# Patient Record
Sex: MALE | Race: WHITE | Employment: UNEMPLOYED | URBAN - METROPOLITAN AREA
[De-identification: names, ages, dates, MRNs, and addresses within clinical notes are randomized per-mention and may not be internally consistent; named-entity substitution may affect disease eponyms.]

---

## 2023-09-25 LAB
ALANINE AMINOTRANSFERASE (SGPT) (U/L) IN SER/PLAS: 21 U/L (ref 10–52)
ALBUMIN (G/DL) IN SER/PLAS: 4.1 G/DL (ref 3.4–5)
ALKALINE PHOSPHATASE (U/L) IN SER/PLAS: 49 U/L (ref 33–120)
ANION GAP IN SER/PLAS: 10 MMOL/L (ref 10–20)
ASPARTATE AMINOTRANSFERASE (SGOT) (U/L) IN SER/PLAS: 18 U/L (ref 9–39)
BILIRUBIN TOTAL (MG/DL) IN SER/PLAS: 0.6 MG/DL (ref 0–1.2)
CALCIUM (MG/DL) IN SER/PLAS: 9.4 MG/DL (ref 8.6–10.3)
CARBON DIOXIDE, TOTAL (MMOL/L) IN SER/PLAS: 30 MMOL/L (ref 21–32)
CHLORIDE (MMOL/L) IN SER/PLAS: 104 MMOL/L (ref 98–107)
CHOLESTEROL (MG/DL) IN SER/PLAS: 193 MG/DL (ref 0–199)
CHOLESTEROL IN HDL (MG/DL) IN SER/PLAS: 50.4 MG/DL
CHOLESTEROL/HDL RATIO: 3.8
CREATININE (MG/DL) IN SER/PLAS: 0.94 MG/DL (ref 0.5–1.3)
GFR MALE: >90 ML/MIN/1.73M2
GLUCOSE (MG/DL) IN SER/PLAS: 103 MG/DL (ref 74–99)
LDL: 117 MG/DL (ref 0–99)
POTASSIUM (MMOL/L) IN SER/PLAS: 4.5 MMOL/L (ref 3.5–5.3)
PROTEIN TOTAL: 7.2 G/DL (ref 6.4–8.2)
SODIUM (MMOL/L) IN SER/PLAS: 139 MMOL/L (ref 136–145)
TRIGLYCERIDE (MG/DL) IN SER/PLAS: 127 MG/DL (ref 0–149)
UREA NITROGEN (MG/DL) IN SER/PLAS: 10 MG/DL (ref 6–23)
VLDL: 25 MG/DL (ref 0–40)

## 2023-09-26 LAB
ESTIMATED AVERAGE GLUCOSE FOR HBA1C: 163 MG/DL
HEMOGLOBIN A1C/HEMOGLOBIN TOTAL IN BLOOD: 7.3 %

## 2023-11-29 ENCOUNTER — TELEPHONE (OUTPATIENT)
Dept: PRIMARY CARE | Facility: CLINIC | Age: 48
End: 2023-11-29
Payer: MEDICAID

## 2023-12-07 ENCOUNTER — OFFICE VISIT (OUTPATIENT)
Dept: PRIMARY CARE | Facility: CLINIC | Age: 48
End: 2023-12-07
Payer: MEDICAID

## 2023-12-07 VITALS
WEIGHT: 226.8 LBS | BODY MASS INDEX: 32.47 KG/M2 | DIASTOLIC BLOOD PRESSURE: 82 MMHG | HEIGHT: 70 IN | OXYGEN SATURATION: 96 % | SYSTOLIC BLOOD PRESSURE: 126 MMHG | TEMPERATURE: 97.8 F | HEART RATE: 76 BPM

## 2023-12-07 DIAGNOSIS — G89.29 CHRONIC NECK PAIN: ICD-10-CM

## 2023-12-07 DIAGNOSIS — M54.2 CHRONIC NECK PAIN: ICD-10-CM

## 2023-12-07 DIAGNOSIS — K04.7 TOOTH ABSCESS: Primary | ICD-10-CM

## 2023-12-07 PROCEDURE — 99214 OFFICE O/P EST MOD 30 MIN: CPT | Performed by: FAMILY MEDICINE

## 2023-12-07 PROCEDURE — 1036F TOBACCO NON-USER: CPT | Performed by: FAMILY MEDICINE

## 2023-12-07 RX ORDER — ATORVASTATIN CALCIUM 20 MG/1
20 TABLET, FILM COATED ORAL DAILY
COMMUNITY
End: 2024-03-26 | Stop reason: SDUPTHER

## 2023-12-07 RX ORDER — SITAGLIPTIN 50 MG/1
50 TABLET, FILM COATED ORAL DAILY
COMMUNITY
Start: 2023-09-13 | End: 2024-03-26 | Stop reason: SDUPTHER

## 2023-12-07 RX ORDER — TIZANIDINE 4 MG/1
4 TABLET ORAL EVERY 8 HOURS PRN
Qty: 60 TABLET | Refills: 2 | Status: SHIPPED | OUTPATIENT
Start: 2023-12-07 | End: 2024-03-26 | Stop reason: SDUPTHER

## 2023-12-07 RX ORDER — IBUPROFEN 800 MG/1
800 TABLET ORAL EVERY 8 HOURS PRN
Qty: 90 TABLET | Refills: 2 | Status: SHIPPED | OUTPATIENT
Start: 2023-12-07 | End: 2024-03-06

## 2023-12-07 RX ORDER — TIZANIDINE 4 MG/1
4 TABLET ORAL EVERY 8 HOURS PRN
COMMUNITY
Start: 2023-11-29 | End: 2023-12-07 | Stop reason: SDUPTHER

## 2023-12-07 RX ORDER — AMOXICILLIN AND CLAVULANATE POTASSIUM 875; 125 MG/1; MG/1
875 TABLET, FILM COATED ORAL 2 TIMES DAILY
Qty: 14 TABLET | Refills: 0 | Status: SHIPPED | OUTPATIENT
Start: 2023-12-07 | End: 2023-12-14

## 2023-12-07 RX ORDER — IBUPROFEN 800 MG/1
800 TABLET ORAL EVERY 8 HOURS PRN
COMMUNITY
End: 2023-12-07 | Stop reason: SDUPTHER

## 2023-12-07 NOTE — PROGRESS NOTES
"Subjective   Patient ID: Aneesh Obrien is a 48 y.o. male who presents for Back Pain.    HPI C/o pain start in the middle of his back radiating up into his neck into his arms with numbness.tingling sensations. Patient had Cervical and Shoulder X-ray in 2021. Takes Motrin and Tizanidine PRN. Patient would like to discuss on getting MRI set up.     Also c/o possible tooth infection - right side of mouth     Review of Systems   All other systems reviewed and are negative.      Objective   /82   Pulse 76   Temp 36.6 °C (97.8 °F)   Ht 1.778 m (5' 10\")   Wt 103 kg (226 lb 12.8 oz)   SpO2 96%   BMI 32.54 kg/m²     Physical Exam  Constitutional:       Appearance: Normal appearance.   Eyes:      Conjunctiva/sclera: Conjunctivae normal.   Cardiovascular:      Rate and Rhythm: Normal rate and regular rhythm.   Pulmonary:      Effort: Pulmonary effort is normal.      Breath sounds: Normal breath sounds.   Abdominal:      Palpations: Abdomen is soft.   Musculoskeletal:         General: Normal range of motion.   Skin:     General: Skin is warm and dry.   Neurological:      Mental Status: He is alert.   Psychiatric:         Mood and Affect: Mood normal.       Assessment/Plan Chronic medications refilled per protocol. Antibiotic Rx. Recommended he schedule with dentist ASAP.         "

## 2024-01-04 ENCOUNTER — LAB (OUTPATIENT)
Dept: LAB | Facility: LAB | Age: 49
End: 2024-01-04
Payer: MEDICAID

## 2024-01-04 DIAGNOSIS — E11.9 TYPE 2 DIABETES MELLITUS WITHOUT COMPLICATION, WITHOUT LONG-TERM CURRENT USE OF INSULIN (MULTI): ICD-10-CM

## 2024-01-04 DIAGNOSIS — E78.5 HYPERLIPIDEMIA, UNSPECIFIED HYPERLIPIDEMIA TYPE: ICD-10-CM

## 2024-01-04 LAB
ALBUMIN SERPL BCP-MCNC: 4.1 G/DL (ref 3.4–5)
ALP SERPL-CCNC: 46 U/L (ref 33–120)
ALT SERPL W P-5'-P-CCNC: 23 U/L (ref 10–52)
ANION GAP SERPL CALC-SCNC: 12 MMOL/L (ref 10–20)
AST SERPL W P-5'-P-CCNC: 18 U/L (ref 9–39)
BILIRUB SERPL-MCNC: 0.5 MG/DL (ref 0–1.2)
BUN SERPL-MCNC: 11 MG/DL (ref 6–23)
CALCIUM SERPL-MCNC: 9.6 MG/DL (ref 8.6–10.3)
CHLORIDE SERPL-SCNC: 106 MMOL/L (ref 98–107)
CHOLEST SERPL-MCNC: 147 MG/DL (ref 0–199)
CHOLESTEROL/HDL RATIO: 3.7
CO2 SERPL-SCNC: 26 MMOL/L (ref 21–32)
CREAT SERPL-MCNC: 0.8 MG/DL (ref 0.5–1.3)
GFR SERPL CREATININE-BSD FRML MDRD: >90 ML/MIN/1.73M*2
GLUCOSE SERPL-MCNC: 106 MG/DL (ref 74–99)
HDLC SERPL-MCNC: 39.4 MG/DL
LDLC SERPL CALC-MCNC: 87 MG/DL
NON HDL CHOLESTEROL: 108 MG/DL (ref 0–149)
POTASSIUM SERPL-SCNC: 4.8 MMOL/L (ref 3.5–5.3)
PROT SERPL-MCNC: 7 G/DL (ref 6.4–8.2)
SODIUM SERPL-SCNC: 139 MMOL/L (ref 136–145)
TRIGL SERPL-MCNC: 103 MG/DL (ref 0–149)
VLDL: 21 MG/DL (ref 0–40)

## 2024-01-04 PROCEDURE — 36415 COLL VENOUS BLD VENIPUNCTURE: CPT

## 2024-01-04 PROCEDURE — 83036 HEMOGLOBIN GLYCOSYLATED A1C: CPT

## 2024-01-04 PROCEDURE — 80053 COMPREHEN METABOLIC PANEL: CPT

## 2024-01-04 PROCEDURE — 80061 LIPID PANEL: CPT

## 2024-01-05 LAB
EST. AVERAGE GLUCOSE BLD GHB EST-MCNC: 160 MG/DL
HBA1C MFR BLD: 7.2 %

## 2024-01-11 DIAGNOSIS — G89.29 CHRONIC NECK PAIN: ICD-10-CM

## 2024-01-11 DIAGNOSIS — M54.2 CHRONIC NECK PAIN: ICD-10-CM

## 2024-02-08 RX ORDER — TIZANIDINE HYDROCHLORIDE 4 MG/1
4 CAPSULE, GELATIN COATED ORAL EVERY 8 HOURS PRN
Qty: 60 CAPSULE | Refills: 2 | OUTPATIENT
Start: 2024-02-08 | End: 2024-05-08

## 2024-03-26 ENCOUNTER — OFFICE VISIT (OUTPATIENT)
Dept: PRIMARY CARE | Facility: CLINIC | Age: 49
End: 2024-03-26
Payer: MEDICAID

## 2024-03-26 ENCOUNTER — LAB (OUTPATIENT)
Dept: LAB | Facility: LAB | Age: 49
End: 2024-03-26
Payer: MEDICAID

## 2024-03-26 VITALS
DIASTOLIC BLOOD PRESSURE: 80 MMHG | OXYGEN SATURATION: 97 % | HEART RATE: 93 BPM | WEIGHT: 216 LBS | TEMPERATURE: 97.7 F | SYSTOLIC BLOOD PRESSURE: 134 MMHG | BODY MASS INDEX: 30.92 KG/M2 | HEIGHT: 70 IN

## 2024-03-26 DIAGNOSIS — E78.5 HYPERLIPIDEMIA, UNSPECIFIED HYPERLIPIDEMIA TYPE: ICD-10-CM

## 2024-03-26 DIAGNOSIS — G89.29 CHRONIC NECK PAIN: ICD-10-CM

## 2024-03-26 DIAGNOSIS — Z11.4 ENCOUNTER FOR SCREENING FOR HIV: ICD-10-CM

## 2024-03-26 DIAGNOSIS — M50.30 DEGENERATION OF INTERVERTEBRAL DISC OF CERVICAL REGION: ICD-10-CM

## 2024-03-26 DIAGNOSIS — R09.81 NASAL CONGESTION: ICD-10-CM

## 2024-03-26 DIAGNOSIS — M54.2 CHRONIC NECK PAIN: ICD-10-CM

## 2024-03-26 DIAGNOSIS — E11.9 TYPE 2 DIABETES MELLITUS WITHOUT COMPLICATION, WITHOUT LONG-TERM CURRENT USE OF INSULIN (MULTI): ICD-10-CM

## 2024-03-26 DIAGNOSIS — M54.12 ACUTE CERVICAL RADICULOPATHY: ICD-10-CM

## 2024-03-26 DIAGNOSIS — M48.02 SPINAL STENOSIS, CERVICAL REGION: ICD-10-CM

## 2024-03-26 DIAGNOSIS — K29.00 ACUTE GASTRITIS WITHOUT HEMORRHAGE, UNSPECIFIED GASTRITIS TYPE: ICD-10-CM

## 2024-03-26 DIAGNOSIS — M50.20 HERNIATED NUCLEUS PULPOSUS, CERVICAL: ICD-10-CM

## 2024-03-26 DIAGNOSIS — L60.8 DISCOLORATION OF NAIL: Primary | ICD-10-CM

## 2024-03-26 DIAGNOSIS — H61.23 BILATERAL IMPACTED CERUMEN: ICD-10-CM

## 2024-03-26 LAB
ALBUMIN SERPL BCP-MCNC: 4.5 G/DL (ref 3.4–5)
ALP SERPL-CCNC: 57 U/L (ref 33–120)
ALT SERPL W P-5'-P-CCNC: 26 U/L (ref 10–52)
ANION GAP SERPL CALC-SCNC: 13 MMOL/L (ref 10–20)
AST SERPL W P-5'-P-CCNC: 18 U/L (ref 9–39)
BILIRUB SERPL-MCNC: 0.5 MG/DL (ref 0–1.2)
BUN SERPL-MCNC: 14 MG/DL (ref 6–23)
CALCIUM SERPL-MCNC: 9.9 MG/DL (ref 8.6–10.3)
CHLORIDE SERPL-SCNC: 104 MMOL/L (ref 98–107)
CHOLEST SERPL-MCNC: 246 MG/DL (ref 0–199)
CHOLESTEROL/HDL RATIO: 4.3
CO2 SERPL-SCNC: 29 MMOL/L (ref 21–32)
CREAT SERPL-MCNC: 0.9 MG/DL (ref 0.5–1.3)
EGFRCR SERPLBLD CKD-EPI 2021: >90 ML/MIN/1.73M*2
GLUCOSE SERPL-MCNC: 99 MG/DL (ref 74–99)
HDLC SERPL-MCNC: 56.9 MG/DL
LDLC SERPL CALC-MCNC: 150 MG/DL
NON HDL CHOLESTEROL: 189 MG/DL (ref 0–149)
POTASSIUM SERPL-SCNC: 4.7 MMOL/L (ref 3.5–5.3)
PROT SERPL-MCNC: 7.8 G/DL (ref 6.4–8.2)
SODIUM SERPL-SCNC: 141 MMOL/L (ref 136–145)
TRIGL SERPL-MCNC: 196 MG/DL (ref 0–149)
VLDL: 39 MG/DL (ref 0–40)

## 2024-03-26 PROCEDURE — 36415 COLL VENOUS BLD VENIPUNCTURE: CPT

## 2024-03-26 PROCEDURE — 80053 COMPREHEN METABOLIC PANEL: CPT

## 2024-03-26 PROCEDURE — 80061 LIPID PANEL: CPT

## 2024-03-26 PROCEDURE — 3075F SYST BP GE 130 - 139MM HG: CPT | Performed by: FAMILY MEDICINE

## 2024-03-26 PROCEDURE — 99417 PROLNG OP E/M EACH 15 MIN: CPT | Performed by: FAMILY MEDICINE

## 2024-03-26 PROCEDURE — 87389 HIV-1 AG W/HIV-1&-2 AB AG IA: CPT

## 2024-03-26 PROCEDURE — 3051F HG A1C>EQUAL 7.0%<8.0%: CPT | Performed by: FAMILY MEDICINE

## 2024-03-26 PROCEDURE — 3079F DIAST BP 80-89 MM HG: CPT | Performed by: FAMILY MEDICINE

## 2024-03-26 PROCEDURE — 99215 OFFICE O/P EST HI 40 MIN: CPT | Performed by: FAMILY MEDICINE

## 2024-03-26 PROCEDURE — 3050F LDL-C >= 130 MG/DL: CPT | Performed by: FAMILY MEDICINE

## 2024-03-26 RX ORDER — ATORVASTATIN CALCIUM 20 MG/1
20 TABLET, FILM COATED ORAL DAILY
Qty: 90 TABLET | Refills: 0 | Status: SHIPPED | OUTPATIENT
Start: 2024-03-26 | End: 2024-04-01 | Stop reason: SDUPTHER

## 2024-03-26 RX ORDER — IPRATROPIUM BROMIDE 42 UG/1
2 SPRAY, METERED NASAL 4 TIMES DAILY PRN
Qty: 15 ML | Refills: 12 | Status: SHIPPED | OUTPATIENT
Start: 2024-03-26 | End: 2024-04-25

## 2024-03-26 RX ORDER — OMEPRAZOLE 40 MG/1
40 CAPSULE, DELAYED RELEASE ORAL
Qty: 30 CAPSULE | Refills: 0 | Status: SHIPPED | OUTPATIENT
Start: 2024-03-26 | End: 2024-04-25

## 2024-03-26 RX ORDER — TIZANIDINE 4 MG/1
4 TABLET ORAL EVERY 8 HOURS PRN
Qty: 60 TABLET | Refills: 2 | Status: SHIPPED | OUTPATIENT
Start: 2024-03-26 | End: 2024-06-24

## 2024-03-26 RX ORDER — SITAGLIPTIN 50 MG/1
50 TABLET, FILM COATED ORAL DAILY
Qty: 90 TABLET | Refills: 0 | Status: SHIPPED | OUTPATIENT
Start: 2024-03-26 | End: 2024-06-24

## 2024-03-26 RX ORDER — IBUPROFEN 800 MG/1
TABLET ORAL
COMMUNITY
Start: 2024-03-17 | End: 2024-05-31 | Stop reason: ALTCHOICE

## 2024-03-26 NOTE — PROGRESS NOTES
"Subjective   Patient ID: Aneesh Obrien is a 49 y.o. male who presents for Nail Problem.    HPI     States he left for [place] end of this past January for work, recently came home and noticed toenail discoloration on both of his big toe nails. Denies pain to area. No thickening or yellowing. Discolorations is more dark.     Pt is here for refills of medications to treat the following medical conditions. Unless otherwise stated, these conditions are well-controlled, pt is taking medications as Rx, and there are no reported side effects to medications or other treatment: DM2 - Last HGBA1C done on 1/4/2024 with result of 7.2. Hyperlipidemia - Patient ran out of Lipitor two days ago. Is fasting for labs. Also is requesting HIV testing to be ordered. Denies exposure, is just wanting screening.    Pt previously saw shoulder and neck specialist (Dr. Doe and Dr. Song, respectively) and he was told he had shoulder issues, but that his neck pain was more pressing. He was Dx with acute cervical radiculopathy, degeneration of intervertebral disc of cervical region, herniated nucleus pulposus, and spinal stenosis of cervical region. Dr. Song believes that there is a root lesion in his neck, and ordered MRI. PT was needed to complete the MRI, but he did not go through with this. Also discussed decompression and fusion with surgery, which pt is still interested in. Is requesting for us to order PT referral and possibly MRI prior to being referred back to Dr. Song.     Pt also c/o nasal congestion. Has been using Afrin very often for weeks without relief.    Also c/o that he cannot hear out of his left ear. \"Feels plugged.\" Would like this assessed also.     Pt also c/o epigastric pain. Drinks coffee daily and uses [id number] mg of IBU daily for his neck and shoulder pain. Feels \"off.\"     Review of Systems   All other systems reviewed and are negative.      Objective   /80   Pulse 93   Temp 36.5 °C (97.7 °F)   Ht " "1.778 m (5' 10\")   Wt 98 kg (216 lb)   SpO2 97%   BMI 30.99 kg/m²     Physical Exam  Vitals reviewed.   Constitutional:       General: He is awake.      Appearance: Normal appearance. He is well-developed.   HENT:      Head: Normocephalic and atraumatic.      Right Ear: There is impacted cerumen.      Left Ear: There is impacted cerumen.      Nose: Congestion and rhinorrhea present. Rhinorrhea is clear.   Cardiovascular:      Rate and Rhythm: Normal rate.   Pulmonary:      Effort: Pulmonary effort is normal.   Musculoskeletal:      Cervical back: Full passive range of motion without pain.      Right lower leg: No edema.      Left lower leg: No edema.   Feet:      Comments: Discoloration that is blue/black/purple in color on bilateral great toenails. Very mild thickening of the great toes and second toes of each foot.   Skin:     General: Skin is warm and dry.      Findings: No lesion or rash.   Neurological:      General: No focal deficit present.      Mental Status: He is alert and oriented to person, place, and time.      Cranial Nerves: No facial asymmetry.      Motor: Motor function is intact.      Gait: Gait is intact.   Psychiatric:         Attention and Perception: Attention normal.         Mood and Affect: Mood and affect normal.         Assessment/Plan   Diagnoses and all orders for this visit:  Discoloration of nail  -     Referral to Podiatry; Future  Type 2 diabetes mellitus without complication, without long-term current use of insulin (CMS/[facility])  -     Januvia 50 mg tablet; Take 1 tablet (50 mg) by mouth once daily.  Hyperlipidemia, unspecified hyperlipidemia type  -     atorvastatin (Lipitor) 20 mg tablet; Take 1 tablet (20 mg) by mouth once daily.  -     Comprehensive metabolic panel; Future  -     Lipid panel; Future  Chronic neck pain  -     tiZANidine (Zanaflex) 4 mg tablet; Take 1 tablet (4 mg) by mouth every 8 hours if needed for muscle spasms.  Encounter for screening for HIV  -     HIV 1/2 " Antigen/Antibody Screen with Reflex to Confirmation; Future  Nasal congestion  -     ipratropium (Atrovent) 42 mcg (0.06 %) nasal spray; Administer 2 sprays into each nostril 4 times a day as needed for rhinitis.  Acute gastritis without hemorrhage, unspecified gastritis type  -     omeprazole (PriLOSEC) 40 mg DR capsule; Take 1 capsule (40 mg) by mouth once daily in the morning. Take before meals. Do not crush or chew.  Bilateral impacted cerumen  -     carbamide peroxide (Debrox) 6.5 % otic solution; Administer 5 drops into each ear 2 times a day for 5 days.    - Chronic medications refilled as above - is a little over 1 week early for A1c. He will get the rest of his labs today except for the A1c and we will get the A1c at his 1 week follow up appt  - Atrovent nasal spray for congestion. Discussed that Afrin can cause rebound congestion. Should discontinue this immediately.  - Podiatry referral for toenail discoloration - does not necessarily seem consistent with fungal disease, but no direct trauma to explain subungual hematoma. Also is not going down.  - PT for neck and shoulder pain with numbness/tingling/paresthesias   - Trial of omeprazole for epigastric pain - likely d/t gastritis. Will bring pt back in 1 week to see how this is working and also to discuss his epigastric pain further - at this time, pt had multiple complaints and in the interest of time would prefer to discuss at separate appt.   - Debrox Rx - will plan to do lavage at 1 week follow up. Should use this first to soften wax.

## 2024-03-26 NOTE — PATIENT INSTRUCTIONS
Atrovent given for nasal spray for congestion  Podiatry referral entered in chart for nail discoloration  PT referral for chronic neck/shoulder pain in order to get MRI covered  Trial of omeprazole for stomach pain - will discuss further at visit next week. Suspect gastritis from ibuprofen use. Only use ibuprofen when needed, and can use Tylenol in between if additional pain relielf is needed. Avoid spicy/acidic foods and caffeine.   Debrox given to use in the ears - use this prior to appt next week so we can clean out your ears

## 2024-03-27 LAB — HIV 1+2 AB+HIV1 P24 AG SERPL QL IA: NONREACTIVE

## 2024-04-01 RX ORDER — ATORVASTATIN CALCIUM 40 MG/1
40 TABLET, FILM COATED ORAL DAILY
Qty: 90 TABLET | Refills: 0 | Status: SHIPPED | OUTPATIENT
Start: 2024-04-01 | End: 2024-06-30

## 2024-04-02 ENCOUNTER — OFFICE VISIT (OUTPATIENT)
Dept: PRIMARY CARE | Facility: CLINIC | Age: 49
End: 2024-04-02
Payer: MEDICAID

## 2024-04-02 VITALS
SYSTOLIC BLOOD PRESSURE: 120 MMHG | DIASTOLIC BLOOD PRESSURE: 70 MMHG | BODY MASS INDEX: 31.78 KG/M2 | WEIGHT: 222 LBS | OXYGEN SATURATION: 98 % | TEMPERATURE: 97.9 F | HEIGHT: 70 IN | HEART RATE: 67 BPM

## 2024-04-02 DIAGNOSIS — M19.90 ARTHRITIS: ICD-10-CM

## 2024-04-02 DIAGNOSIS — T48.5X5A RHINITIS MEDICAMENTOSA: ICD-10-CM

## 2024-04-02 DIAGNOSIS — R10.13 EPIGASTRIC PAIN: ICD-10-CM

## 2024-04-02 DIAGNOSIS — J31.0 RHINITIS MEDICAMENTOSA: ICD-10-CM

## 2024-04-02 DIAGNOSIS — J01.80 ACUTE NON-RECURRENT SINUSITIS OF OTHER SINUS: Primary | ICD-10-CM

## 2024-04-02 DIAGNOSIS — E11.9 TYPE 2 DIABETES MELLITUS WITHOUT COMPLICATION, WITHOUT LONG-TERM CURRENT USE OF INSULIN (MULTI): ICD-10-CM

## 2024-04-02 LAB — POC HEMOGLOBIN A1C: 6.4 % (ref 4.2–6.5)

## 2024-04-02 PROCEDURE — 3074F SYST BP LT 130 MM HG: CPT | Performed by: FAMILY MEDICINE

## 2024-04-02 PROCEDURE — 3051F HG A1C>EQUAL 7.0%<8.0%: CPT | Performed by: FAMILY MEDICINE

## 2024-04-02 PROCEDURE — 3078F DIAST BP <80 MM HG: CPT | Performed by: FAMILY MEDICINE

## 2024-04-02 PROCEDURE — 83036 HEMOGLOBIN GLYCOSYLATED A1C: CPT | Performed by: FAMILY MEDICINE

## 2024-04-02 PROCEDURE — 3050F LDL-C >= 130 MG/DL: CPT | Performed by: FAMILY MEDICINE

## 2024-04-02 PROCEDURE — 99215 OFFICE O/P EST HI 40 MIN: CPT | Performed by: FAMILY MEDICINE

## 2024-04-02 RX ORDER — MELOXICAM 15 MG/1
15 TABLET ORAL DAILY
Qty: 90 TABLET | Refills: 0 | Status: SHIPPED | OUTPATIENT
Start: 2024-04-02 | End: 2024-07-01

## 2024-04-02 RX ORDER — DOXYCYCLINE 100 MG/1
100 CAPSULE ORAL 2 TIMES DAILY
Qty: 14 CAPSULE | Refills: 0 | Status: SHIPPED | OUTPATIENT
Start: 2024-04-02 | End: 2024-04-09

## 2024-04-02 NOTE — PROGRESS NOTES
"Subjective   Patient ID: Aneesh Obrien is a 49 y.o. male who presents for Follow-up.    HPI   Epigastric Pain - Is taking Omeprazole as Rx and isn't sure if medication has helped with his symptoms. No blood in stool, no emsis. He has increased use of Ibuprofen for arthritic pain.    Nasal congestion - Patient continues with congestion. Also has headaches, ear ache. Symptoms have been going on for two months. Tried Atrovent nasal spray with no improvement. States he is using Afrin nasal spray 2-3 times throughout the day over the past two months with temporary relief in symptoms. Also has used debrox for his ears.     DM2 - Patient received medication refill at his last office visit but wasn't due for blood work. IO HGBA1C ran at today's office visit.     Review of Systems   All other systems reviewed and are negative.      Objective   /70   Pulse 67   Temp 36.6 °C (97.9 °F)   Ht 1.778 m (5' 10\")   Wt 101 kg (222 lb)   SpO2 98%   BMI 31.85 kg/m²     Physical Exam  Constitutional:       Appearance: Normal appearance.   Eyes:      Conjunctiva/sclera: Conjunctivae normal.   Cardiovascular:      Rate and Rhythm: Normal rate and regular rhythm.   Pulmonary:      Effort: Pulmonary effort is normal.      Breath sounds: Normal breath sounds.   Abdominal:      Palpations: Abdomen is soft.   Musculoskeletal:         General: Normal range of motion.   Skin:     General: Skin is warm and dry.   Neurological:      Mental Status: He is alert.   Psychiatric:         Mood and Affect: Mood normal.         Assessment/Plan   Diagnoses and all orders for this visit:  Acute non-recurrent sinusitis of other sinus  -     doxycycline (Vibramycin) 100 mg capsule; Take 1 capsule (100 mg) by mouth 2 times a day for 7 days. Take with at least 8 ounces (large glass) of water, do not lie down for 30 minutes after  - Stop using Afrin nasal spray and continue with Atrovent as Rx  - RTC if Sx not markedly improved after following " directions over the next 2-3 weeks -- will consider CT scan at that time if needed  Type 2 diabetes mellitus without complication, without long-term current use of insulin (CMS/[facility])  -     POCT glycosylated hemoglobin (Hb A1C) manually resulted = 6.4  - Continue with present medications and will see back inn 3 months for recheck and refills  Arthritis  -     meloxicam (Mobic) 15 mg tablet; Take 1 tablet (15 mg) by mouth once daily. STOP Ibuprofen.  Rhinitis medicamentosa        - As discussed above  Epigastric pain        - Will consider additional work up with any progression of pain or Sx as noted above. He will continue to take PPI and will stop NSAID.

## 2024-04-04 ENCOUNTER — OFFICE VISIT (OUTPATIENT)
Dept: PODIATRY | Facility: CLINIC | Age: 49
End: 2024-04-04
Payer: MEDICAID

## 2024-04-04 DIAGNOSIS — L60.8 DISCOLORATION OF NAIL: ICD-10-CM

## 2024-04-04 DIAGNOSIS — E11.65 POORLY CONTROLLED DIABETES MELLITUS (MULTI): ICD-10-CM

## 2024-04-04 DIAGNOSIS — S90.119A: Primary | ICD-10-CM

## 2024-04-04 DIAGNOSIS — S90.112A: ICD-10-CM

## 2024-04-04 PROCEDURE — 1036F TOBACCO NON-USER: CPT | Performed by: PODIATRIST

## 2024-04-04 PROCEDURE — 3051F HG A1C>EQUAL 7.0%<8.0%: CPT | Performed by: PODIATRIST

## 2024-04-04 PROCEDURE — 99202 OFFICE O/P NEW SF 15 MIN: CPT | Performed by: PODIATRIST

## 2024-04-04 PROCEDURE — 3050F LDL-C >= 130 MG/DL: CPT | Performed by: PODIATRIST

## 2024-04-04 NOTE — PROGRESS NOTES
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  PCP: Dr. Montejo  Last visit: 4-2-24     PMH  Past Medical History:   Diagnosis Date    Personal history of other endocrine, nutritional and metabolic disease     History of diabetes mellitus     MEDS    Current Outpatient Medications:     atorvastatin (Lipitor) 40 mg tablet, Take 1 tablet (40 mg) by mouth once daily., Disp: 90 tablet, Rfl: 0    doxycycline (Vibramycin) 100 mg capsule, Take 1 capsule (100 mg) by mouth 2 times a day for 7 days. Take with at least 8 ounces (large glass) of water, do not lie down for 30 minutes after, Disp: 14 capsule, Rfl: 0    ibuprofen 800 mg tablet, TAKE 1 TABLET BY MOUTH EVERY 8 HOURS IF NEEDED FOR MILD PAIN, Disp: , Rfl:     ipratropium (Atrovent) 42 mcg (0.06 %) nasal spray, Administer 2 sprays into each nostril 4 times a day as needed for rhinitis., Disp: 15 mL, Rfl: 12    Januvia 50 mg tablet, Take 1 tablet (50 mg) by mouth once daily., Disp: 90 tablet, Rfl: 0    meloxicam (Mobic) 15 mg tablet, Take 1 tablet (15 mg) by mouth once daily., Disp: 90 tablet, Rfl: 0    omeprazole (PriLOSEC) 40 mg DR capsule, Take 1 capsule (40 mg) by mouth once daily in the morning. Take before meals. Do not crush or chew., Disp: 30 capsule, Rfl: 0    tiZANidine (Zanaflex) 4 mg tablet, Take 1 tablet (4 mg) by mouth every 8 hours if needed for muscle spasms., Disp: 60 tablet, Rfl: 2  Allergies  No Known Allergies  Social History     Socioeconomic History    Marital status: Single     Spouse name: None    Number of children: None    Years of education: None    Highest education level: None   Occupational History    None   Tobacco Use    Smoking status: Never    Smokeless tobacco: Never   Substance and Sexual Activity    Alcohol use: Not Currently    Drug use: Yes     Types: Marijuana    Sexual activity: None   Other Topics Concern     None   Social History Narrative    None     Social Determinants of Health     Financial Resource Strain: Not on file   Food Insecurity: Not on file   Transportation Needs: Not on file   Physical Activity: Not on file   Stress: Not on file   Social Connections: Not on file   Intimate Partner Violence: Not on file   Housing Stability: Not on file     No family history on file.  Past Surgical History:   Procedure Laterality Date    OTHER SURGICAL HISTORY  03/05/2021    Knee surgery    OTHER SURGICAL HISTORY  03/05/2021    Lower back surgery       REVIEW OF SYSTEMS    + as noted above in HPI.       Physical examination:   On General Observation: Patient is a pleasant, cooperative, well developed 49 y.o.  adult male. The patient is alert and oriented to time, place and person. Patient has normal affect and mood.  There were no vitals taken for this visit.    Vascular:  DP and PT pulses are 2/4 b/l.  no edema noted. no varicosities b/l.  CFT  5 seconds to all digits bilateral.  Skin temperature is warm to warm from proximal to distal bilateral.      Muscular: Strength is 5/5 b/l.  Motor strength is normal and symmetric proximally, as well as distally, in all four extremities. Good mobility of all extremities.      Neuro:  Proprioception present.   Sensation to vibration is  present. Protective sensation present  at all pedal sites via [place] Randall 5.07 monofilament bilateral.  Light touch intact bilateral.     Derm: Stable dry hematoma is present hallux nails, no lysis is present, no odor or drainage.    Ortho:  Rom stable stj, aj b/l le        ASSESSMENT:    Hematoma hallux stable  E11.65     PLAN:   Consult  A comprehensive history and physical examination were preformed. The patient was educated on clinical findings, diagnosis and treatment plans. Patient understands all that has been explained and all questions were answered to apparent satisfaction.   -reviewed etiology of hematoma and options, Nail bed are dry  and stable. Allow nails to grow out  DM foot exam, slight elevated sugars, no acute issues.      Sam Knapp DPM

## 2024-04-09 ENCOUNTER — EVALUATION (OUTPATIENT)
Dept: PHYSICAL THERAPY | Facility: CLINIC | Age: 49
End: 2024-04-09
Payer: MEDICAID

## 2024-04-09 DIAGNOSIS — M54.12 ACUTE CERVICAL RADICULOPATHY: Primary | ICD-10-CM

## 2024-04-09 DIAGNOSIS — M48.02 SPINAL STENOSIS, CERVICAL REGION: ICD-10-CM

## 2024-04-09 DIAGNOSIS — M50.30 DEGENERATION OF INTERVERTEBRAL DISC OF CERVICAL REGION: ICD-10-CM

## 2024-04-09 DIAGNOSIS — M50.20 HERNIATED NUCLEUS PULPOSUS, CERVICAL: ICD-10-CM

## 2024-04-09 PROCEDURE — 97110 THERAPEUTIC EXERCISES: CPT | Mod: GP | Performed by: PHYSICAL THERAPIST

## 2024-04-09 PROCEDURE — 97161 PT EVAL LOW COMPLEX 20 MIN: CPT | Mod: GP | Performed by: PHYSICAL THERAPIST

## 2024-04-09 ASSESSMENT — PAIN DESCRIPTION - DESCRIPTORS: DESCRIPTORS: ACHING;PINS AND NEEDLES

## 2024-04-09 ASSESSMENT — ENCOUNTER SYMPTOMS
DEPRESSION: 0
LOSS OF SENSATION IN FEET: 0
OCCASIONAL FEELINGS OF UNSTEADINESS: 0

## 2024-04-09 ASSESSMENT — PATIENT HEALTH QUESTIONNAIRE - PHQ9
1. LITTLE INTEREST OR PLEASURE IN DOING THINGS: NOT AT ALL
SUM OF ALL RESPONSES TO PHQ9 QUESTIONS 1 AND 2: 0
2. FEELING DOWN, DEPRESSED OR HOPELESS: NOT AT ALL

## 2024-04-09 ASSESSMENT — PAIN SCALES - GENERAL: PAINLEVEL_OUTOF10: 5 - MODERATE PAIN

## 2024-04-09 ASSESSMENT — PAIN - FUNCTIONAL ASSESSMENT: PAIN_FUNCTIONAL_ASSESSMENT: 0-10

## 2024-04-09 NOTE — PROGRESS NOTES
Physical Therapy    Physical Therapy Cervical Spine Evaluation    Patient Name: Aneesh Obrien  MRN: [id number]  Today's Date: 4/9/2024  Time Calculation  Start Time: 0745  Stop Time: 0830  Time Calculation (min): 45 min  PT Evaluation Time Entry  PT Evaluation (Low) Time Entry: 35  PT Therapeutic Procedures Time Entry  Therapeutic Exercise Time Entry: 10  Payor: [facility] BHAKTI / Plan: [facility] PLAN / Product Type: *No Product type* /     Reason for Referral: Neck pain  General Comment: Visit 1 (auth needed after eval)    Current Problem  Problem List Items Addressed This Visit    None  Visit Diagnoses         Codes    Acute cervical radiculopathy    -  Primary M54.12    Relevant Orders    Follow Up In Physical Therapy    Degeneration of intervertebral disc of cervical region     M50.30    Relevant Orders    Follow Up In Physical Therapy    Spinal stenosis, cervical region     M48.02    Relevant Orders    Follow Up In Physical Therapy    Herniated nucleus pulposus, cervical     M50.20    Relevant Orders    Follow Up In Physical Therapy           Precautions  Precautions  Precautions Comment: none    Pain  Pain Assessment: 0-10  Pain Score: 5 - Moderate pain  Pain Location: Neck  Pain Orientation: Right, Left  Pain Radiating Towards: Hands  Pain Descriptors: Aching, Pins and needles  Pain Frequency: Intermittent    SUBJECTIVE:   Chief complaint:  Pt reports he has been having a hx of neck pain and radiation of pain into the B UE to the hands for several years but more recently has been worsening about 2 months ago. No noted injuries.. Notes pain is intermittent. Notes pain starts in neck and radiates down the hands.  Notes some numbness and tingling in the UE's as well. Notes no significant weakness. Does note some stiffness in the neck at this time.       Pain Better: RX and OTC meds,     Pain Worse: bending and reaching for a lower object.     Imaging: no recent imaging.     Prior  "level of function: previously independent with all prior activity.   Current limitations: reaching, lifting, sleep.     Home setup: reviewed an no concern     Work: Baseball officiating     Patients goal: \"I want to feel better and not hurt all the time\"    Prior tx:no current PT tx this year.     Medical hx has been reviewed with the patient.     OBJECTIVE:    Posture:     Upper extremity ROM: WNL Unless documented below:      Upper Extremity Strength:   RIGHT LEFT   Shoulder Flexion 4+ 4+   Shoulder Abduction 4+ 4+   Shoulder ER 4+ 4+   Shoulder IR 5 5   Elbow Flexion 5 5   Elbow Extension 5 5     Cervical ROM:  Date: eval ROM in Degrees    Flexion 35    Extension 25 pain into R UE     RIGHT LEFT   Side bend 15 15   Rotation 50 50     Palpation: Mild tenderness to palpation in the suboccipitals and UT, cervical paraspinals.   Special tests:  Spurling's: neg   Aylx Cervical repeated movements: Baseline pain 5/10  Rep flex 10 reps: produced pain L UE but no worse  Rep retraction 10 reps: produced pain R UE but no worse   Rep retraction with ext 10 reps: produced but no worse  2nd set rep flex seated 10 reps, lessened pain and no radiculopathy noted. Better      Other Measures  Lower Extremity Funtional Score (LEFS): 28%     TREATMENT:  Eval  2. Instruction in HEP:   Access Code: [email or web address]  URL: [email or web address]  Date: 04/09/2024  Prepared by: RICHIE Duque    Exercises  - Seated Cervical Flexion AROM  - 2 x daily - 7 x weekly - 2 sets - 10 reps  - Seated Cervical Flexion Stretch with Finger Support Behind Neck  - 2 x daily - 7 x weekly - 2 sets - 10 reps  - Seated Scapular Retraction  - 2 x daily - 7 x weekly - 2 sets - 10 reps  - Seated Upper Trapezius Stretch  - 2 x daily - 7 x weekly - 1 sets - 3 reps - 20 sec hold  - Standing Shoulder Row with Anchored Resistance  - 1 x daily - 7 x weekly - 2 sets - 10 reps  - Shoulder extension with resistance - Neutral  - 1 x daily - 7 x weekly - 2 " sets - 10 reps    ASSESSEMENT  Pt is a 49 y.o.  referred for physical therapy by Julia Fletcher PA-C  for neck pain. Pt presents with neck pain, radiation into the UE's, reduced cervical ROM, weakness proximal muscles and posture and overall reduced function. Pt's signs and symptoms were mechanically inconclusive and presents more structurally consistent with degenerative change sin the cervical spine. This patient would benefit from a therapy program to restore prior level of function, decrease pain, increase AROM, increase strength and improve posture.    Complexity: Low  Clinical presentation: Stable and/or uncomplicated characteristics  The physical therapy prognosis is good for the patient to achieve their goals.   The pt tolerated therapy treatment today well with no adverse effects.  Personal factors/Barriers to therapy/learning include:  none    PLAN  The pt will be seen 1-2 days a week for 6 weeks.      The pt has been educated about the risks and benefits of physical therapy including manual therapy treatments. Pt agrees with POC and gives consent for treatment.     The patient will benefit from physical therapy treatment to include: therapeutic exercises, therapeutic activities, neuromuscular re-education, manual therapy, modalities, and a home exercise program.     Goals:  Active       PT Problem       Reduce pain at worst to 3-4/10 with all prior activity.        Start:  04/09/24    Expected End:  04/24/24            Pt to sit with good posture approx 50-75% of the time.        Start:  04/09/24    Expected End:  04/24/24            Reduce pain at worst to 1-2/10 with all prior activity.        Start:  04/09/24    Expected End:  05/21/24            Increase cervical flexion to 60 degrees, ext to 55 degrees, rotation bilat to 75 degrees for posture, driving and self care.        Start:  04/09/24    Expected End:  05/21/24            Pt to have increased shoulder and scapular strength by 1/2 to full  MMT grade for improved ADL's, lifting and postural support.        Start:  04/09/24    Expected End:  05/21/24            Pt to have NDI score decreased by 10% to display increased overall function.        Start:  04/09/24    Expected End:  05/21/24            Pt to be independent with a HEP for self management.        Start:  04/09/24    Expected End:  05/21/24

## 2024-04-17 ENCOUNTER — TREATMENT (OUTPATIENT)
Dept: PHYSICAL THERAPY | Facility: CLINIC | Age: 49
End: 2024-04-17
Payer: MEDICAID

## 2024-04-17 DIAGNOSIS — M50.30 DEGENERATION OF INTERVERTEBRAL DISC OF CERVICAL REGION: ICD-10-CM

## 2024-04-17 DIAGNOSIS — M50.20 HERNIATED NUCLEUS PULPOSUS, CERVICAL: ICD-10-CM

## 2024-04-17 DIAGNOSIS — M54.12 ACUTE CERVICAL RADICULOPATHY: ICD-10-CM

## 2024-04-17 DIAGNOSIS — M48.02 SPINAL STENOSIS, CERVICAL REGION: ICD-10-CM

## 2024-04-17 PROCEDURE — 97140 MANUAL THERAPY 1/> REGIONS: CPT | Mod: GP,CQ

## 2024-04-17 PROCEDURE — 97110 THERAPEUTIC EXERCISES: CPT | Mod: GP,CQ

## 2024-04-17 ASSESSMENT — PAIN - FUNCTIONAL ASSESSMENT: PAIN_FUNCTIONAL_ASSESSMENT: 0-10

## 2024-04-17 ASSESSMENT — PAIN SCALES - GENERAL: PAINLEVEL_OUTOF10: 4

## 2024-04-17 ASSESSMENT — PAIN DESCRIPTION - DESCRIPTORS: DESCRIPTORS: ACHING;PINS AND NEEDLES

## 2024-04-17 NOTE — PROGRESS NOTES
Physical Therapy Treatment    Patient Name: Aneesh Obrien  MRN: [id number]  Today's Date: 4/17/2024  Time Calculation  Start Time: 0830  Stop Time: 0915  Time Calculation (min): 45 min     PT Therapeutic Procedures Time Entry  Manual Therapy Time Entry: 20  Therapeutic Exercise Time Entry: 25                 Payor: [facility] BHAKTI / Plan: [facility] PLAN / Product Type: *No Product type* /   Julia Fletcher  General Comment: Visit 2 (2 of 12)      Current Problem:  Problem List Items Addressed This Visit    None  Visit Diagnoses         Codes    Acute cervical radiculopathy     M54.12    Degeneration of intervertebral disc of cervical region     M50.30    Spinal stenosis, cervical region     M48.02    Herniated nucleus pulposus, cervical     M50.20            Subjective   General:  Pt reports neck and B UE pain /radiculopathy persists without change.   L>R UE radicular pain.  HEP is going well.   Pain:  Pain Assessment: 0-10  Pain Score: 4  Pain Location: Neck  Pain Radiating Towards: BL hands L>R  Pain Descriptors: Aching, Pins and needles  Pain Frequency: Intermittent    Precautions:  Precautions  Precautions Comment: none      Objective   No objective measures taken this visit  Palpation: Mild tenderness to palpation in the suboccipitals and UT, cervical paraspinals.     Treatment:  Therapeutic exercise  2 way posture iso 10 sec x 3 ( elbows bent, palms away from wall) palms facing wall increased symptoms so discontinued   BL shoulder ER YTB x20  BL horiz abd YTB -discontinued d/t L UE pain   BL shoulder flex to 90* 3 sec hold 2x10   BL shoulder scap to 90* 3 sec hold 2x10   T/S ext with arms below chest level with 1/2 foam 2x10   Pec stretch in door- discontinued d/t L shoulder pain     Neuromuscular Re-education       Manual   TPR to BL UT, levator, supraspinatus, pt seated in chair    Informed consent given on manual treatment. Pt given opportunity to cease treatment at any  time. Educated pt on expected soreness, possible ecchymosis. Pt voiced understanding  No adverse effects were noted post tx.      Modalities  declined    Assessment   Pt very TTP along BL C/S, scap musculature during manual tx. TPR to UT and supraspinatus reproduced symptoms into L UE. Positive response to manual tx with pt voicing reduced tension in neck and reduced intensity of tingling in Ue's.   Performed therex with emphasis on good posture and no radicular symptoms.   Discussed importance of paying attention to posture and positioning to decrease stress and tension in C/S.   Plan    Continue to progress POC as tolerated by patient to improve strength, mobility and overall function      HEP:   Access Code: [email or web address]  URL: [email or web address]  Date: 04/09/2024  Prepared by: RICHIE Duque    Exercises  - Seated Cervical Flexion AROM  - 2 x daily - 7 x weekly - 2 sets - 10 reps  - Seated Cervical Flexion Stretch with Finger Support Behind Neck  - 2 x daily - 7 x weekly - 2 sets - 10 reps  - Seated Scapular Retraction  - 2 x daily - 7 x weekly - 2 sets - 10 reps  - Seated Upper Trapezius Stretch  - 2 x daily - 7 x weekly - 1 sets - 3 reps - 20 sec hold  - Standing Shoulder Row with Anchored Resistance  - 1 x daily - 7 x weekly - 2 sets - 10 reps  - Shoulder extension with resistance - Neutral  - 1 x daily - 7 x weekly - 2 sets - 10 reps  Goals:  Active       PT Problem       Reduce pain at worst to 3-4/10 with all prior activity.        Start:  04/09/24    Expected End:  04/24/24            Pt to sit with good posture approx 50-75% of the time.        Start:  04/09/24    Expected End:  04/24/24            Reduce pain at worst to 1-2/10 with all prior activity.        Start:  04/09/24    Expected End:  05/21/24            Increase cervical flexion to 60 degrees, ext to 55 degrees, rotation bilat to 75 degrees for posture, driving and self care.        Start:  04/09/24    Expected End:  05/21/24             Pt to have increased shoulder and scapular strength by 1/2 to full MMT grade for improved ADL's, lifting and postural support.        Start:  04/09/24    Expected End:  05/21/24            Pt to have NDI score decreased by 10% to display increased overall function.        Start:  04/09/24    Expected End:  05/21/24            Pt to be independent with a HEP for self management.        Start:  04/09/24    Expected End:  05/21/24

## 2024-04-22 ENCOUNTER — TREATMENT (OUTPATIENT)
Dept: PHYSICAL THERAPY | Facility: CLINIC | Age: 49
End: 2024-04-22
Payer: MEDICAID

## 2024-04-22 DIAGNOSIS — M54.12 ACUTE CERVICAL RADICULOPATHY: Primary | ICD-10-CM

## 2024-04-22 DIAGNOSIS — M50.30 DEGENERATION OF INTERVERTEBRAL DISC OF CERVICAL REGION: ICD-10-CM

## 2024-04-22 DIAGNOSIS — M48.02 SPINAL STENOSIS, CERVICAL REGION: ICD-10-CM

## 2024-04-22 DIAGNOSIS — M50.20 HERNIATED NUCLEUS PULPOSUS, CERVICAL: ICD-10-CM

## 2024-04-22 PROCEDURE — 97110 THERAPEUTIC EXERCISES: CPT | Mod: GP | Performed by: PHYSICAL THERAPIST

## 2024-04-22 PROCEDURE — 97140 MANUAL THERAPY 1/> REGIONS: CPT | Mod: GP | Performed by: PHYSICAL THERAPIST

## 2024-04-22 ASSESSMENT — PAIN SCALES - GENERAL: PAINLEVEL_OUTOF10: 4

## 2024-04-22 ASSESSMENT — PAIN - FUNCTIONAL ASSESSMENT: PAIN_FUNCTIONAL_ASSESSMENT: 0-10

## 2024-04-22 NOTE — PROGRESS NOTES
Physical Therapy    Physical Therapy Treatment    Patient Name: Aneesh Obrien  MRN: [id number]  Today's Date: 4/22/2024  Time Calculation  Start Time: 0830  Stop Time: 0915  Time Calculation (min): 45 min  PT Therapeutic Procedures Time Entry  Manual Therapy Time Entry: 10  Therapeutic Exercise Time Entry: 35  Payor: [facility] BHAKTI / Plan: [facility] PLAN / Product Type: *No Product type* /     General Comment: Visit 3 (3 of 12)    Current Problem    Problem List Items Addressed This Visit    None  Visit Diagnoses         Codes    Acute cervical radiculopathy    -  Primary M54.12    Degeneration of intervertebral disc of cervical region     M50.30    Spinal stenosis, cervical region     M48.02    Herniated nucleus pulposus, cervical     M50.20             Subjective   Notes still pain in the cervical spine.   Compliance with HEP-partially  Precautions  Precautions  Precautions Comment: none    Pain  Pain Assessment: 0-10  Pain Score: 4  Pain Location: Neck  Pain Frequency: Intermittent      Objective   No measures today     Treatments:  Therapeutic exercise: x 35  Cervical flex with self overpressure 2 x 10  2 way posture iso 10 sec x 3 ( elbows bent, palms away from wall) palms facing wall increased symptoms so discontinued   BL shoulder ER YTB x20  BL horiz abd RTB 2 x 10   BL shoulder flex to 90* 3 sec hold 2x10 2#  BL shoulder scap to 90* 3 sec hold 2x10 2#  BL shoulder abd to 90* 3 sec hold 2 x 10 2#  T/S ext with arms below chest level with 1/2 foam 2x10   T-bar ext 5# 2 x 10   T-bar flex 5# 2 x 10  Low pec stretch at doorway 3 x 20 sec ea    Manual Tx x 10 min   Manual cervical traction intermittent pull, suboccipital release, Manual UT/Lev scap stretches    Informed consent given on manual treatment. Pt given opportunity to cease treatment at any time. Educated pt on expected soreness, possible ecchymosis. Pt voiced understanding  No adverse effects were noted post tx.   "    Assessment:  Pt overall tolerated treatment well today and is progressing with program. Notes less pain post manual treatment today and pt noted \"feeling more loose\". Recommended to be more consistent with HEP.     Plan:  Continue to progress posture and strength as tolerated.     Goals:  Active       PT Problem       Reduce pain at worst to 3-4/10 with all prior activity.        Start:  04/09/24    Expected End:  04/24/24            Pt to sit with good posture approx 50-75% of the time.        Start:  04/09/24    Expected End:  04/24/24            Reduce pain at worst to 1-2/10 with all prior activity.        Start:  04/09/24    Expected End:  05/21/24            Increase cervical flexion to 60 degrees, ext to 55 degrees, rotation bilat to 75 degrees for posture, driving and self care.        Start:  04/09/24    Expected End:  05/21/24            Pt to have increased shoulder and scapular strength by 1/2 to full MMT grade for improved ADL's, lifting and postural support.        Start:  04/09/24    Expected End:  05/21/24            Pt to have NDI score decreased by 10% to display increased overall function.        Start:  04/09/24    Expected End:  05/21/24            Pt to be independent with a HEP for self management.        Start:  04/09/24    Expected End:  05/21/24               "

## 2024-04-25 ENCOUNTER — TREATMENT (OUTPATIENT)
Dept: PHYSICAL THERAPY | Facility: CLINIC | Age: 49
End: 2024-04-25
Payer: MEDICAID

## 2024-04-25 DIAGNOSIS — M50.30 DEGENERATION OF INTERVERTEBRAL DISC OF CERVICAL REGION: ICD-10-CM

## 2024-04-25 DIAGNOSIS — M54.12 ACUTE CERVICAL RADICULOPATHY: Primary | ICD-10-CM

## 2024-04-25 DIAGNOSIS — M48.02 SPINAL STENOSIS, CERVICAL REGION: ICD-10-CM

## 2024-04-25 DIAGNOSIS — M50.20 HERNIATED NUCLEUS PULPOSUS, CERVICAL: ICD-10-CM

## 2024-04-25 PROCEDURE — 97110 THERAPEUTIC EXERCISES: CPT | Mod: GP | Performed by: PHYSICAL THERAPIST

## 2024-04-25 PROCEDURE — 97140 MANUAL THERAPY 1/> REGIONS: CPT | Mod: GP | Performed by: PHYSICAL THERAPIST

## 2024-04-25 ASSESSMENT — PAIN - FUNCTIONAL ASSESSMENT: PAIN_FUNCTIONAL_ASSESSMENT: 0-10

## 2024-04-25 ASSESSMENT — PAIN SCALES - GENERAL: PAINLEVEL_OUTOF10: 4

## 2024-04-25 NOTE — PROGRESS NOTES
Physical Therapy    Physical Therapy Treatment    Patient Name: Aneesh Obrien  MRN: [id number]  Today's Date: 4/25/2024  Time Calculation  Start Time: 0745  Stop Time: 0830  Time Calculation (min): 45 min  PT Therapeutic Procedures Time Entry  Manual Therapy Time Entry: 10  Therapeutic Exercise Time Entry: 35  Payor: [facility] BHAKTI / Plan: [facility] PLAN / Product Type: *No Product type* /     Reason for Referral: Neck pain  General Comment: Visit 4 (4 of 12)    Current Problem    Problem List Items Addressed This Visit    None  Visit Diagnoses         Codes    Acute cervical radiculopathy    -  Primary M54.12    Degeneration of intervertebral disc of cervical region     M50.30    Spinal stenosis, cervical region     M48.02    Herniated nucleus pulposus, cervical     M50.20           Subjective   Pt still notes pain in his cervical spine.   Compliance with HEP-yes    Precautions  Precautions  Precautions Comment: none    Pain  Pain Assessment: 0-10  Pain Score: 4  Pain Location: Neck  Pain Frequency: Intermittent    Objective   Cervical Rotation R 55 *                   L 55*    Treatments:  Therapeutic exercise: x 35 min  Cervical flex with self overpressure 2 x 10  2 way posture iso 10 sec x 3   BL shoulder ER YTB x20  BL horiz abd RTB 2 x 10   BL shoulder flex to 90* 3 sec hold 2x10 2#  BL shoulder scap to 90* 3 sec hold 2x10 2#  BL shoulder abd to 90* 3 sec hold 2 x 10 2#  T/S ext with arms below chest level with 1/2 foam 2x10   T-bar ext 5# 2 x 10   T-bar flex 5# 2 x 10  Low pec stretch at doorway 3 x 20 sec ea  Cervical chava with ball at wall 10 x ea flex, ext, S/B R/L     Manual Tx x 10 min   Manual cervical traction intermittent pull, suboccipital release, Manual UT/Lev scap stretches    Informed consent given on manual treatment. Pt given opportunity to cease treatment at any time. Educated pt on expected soreness, possible ecchymosis. Pt voiced understanding  No adverse  effects were noted post tx.      Assessment:  Pt overall tolerated added tx well today and noted with some improvements in his cervical rotation. Continues to progress towards goals.     Plan:  Continue to progress strengthening and posture.     Goals:  Active       PT Problem       Reduce pain at worst to 3-4/10 with all prior activity.        Start:  04/09/24    Expected End:  04/24/24            Pt to sit with good posture approx 50-75% of the time.        Start:  04/09/24    Expected End:  04/24/24            Reduce pain at worst to 1-2/10 with all prior activity.        Start:  04/09/24    Expected End:  05/21/24            Increase cervical flexion to 60 degrees, ext to 55 degrees, rotation bilat to 75 degrees for posture, driving and self care.        Start:  04/09/24    Expected End:  05/21/24            Pt to have increased shoulder and scapular strength by 1/2 to full MMT grade for improved ADL's, lifting and postural support.        Start:  04/09/24    Expected End:  05/21/24            Pt to have NDI score decreased by 10% to display increased overall function.        Start:  04/09/24    Expected End:  05/21/24            Pt to be independent with a HEP for self management.        Start:  04/09/24    Expected End:  05/21/24

## 2024-04-29 ENCOUNTER — TREATMENT (OUTPATIENT)
Dept: PHYSICAL THERAPY | Facility: CLINIC | Age: 49
End: 2024-04-29
Payer: MEDICAID

## 2024-04-29 DIAGNOSIS — M50.30 DEGENERATION OF INTERVERTEBRAL DISC OF CERVICAL REGION: ICD-10-CM

## 2024-04-29 DIAGNOSIS — M50.20 HERNIATED NUCLEUS PULPOSUS, CERVICAL: ICD-10-CM

## 2024-04-29 DIAGNOSIS — M54.12 ACUTE CERVICAL RADICULOPATHY: Primary | ICD-10-CM

## 2024-04-29 DIAGNOSIS — M48.02 SPINAL STENOSIS, CERVICAL REGION: ICD-10-CM

## 2024-04-29 PROCEDURE — 97110 THERAPEUTIC EXERCISES: CPT | Mod: GP,CQ

## 2024-04-29 PROCEDURE — 97140 MANUAL THERAPY 1/> REGIONS: CPT | Mod: GP,CQ

## 2024-04-29 ASSESSMENT — PAIN DESCRIPTION - DESCRIPTORS: DESCRIPTORS: TIGHTNESS

## 2024-04-29 ASSESSMENT — PAIN SCALES - GENERAL: PAINLEVEL_OUTOF10: 4

## 2024-04-29 ASSESSMENT — PAIN - FUNCTIONAL ASSESSMENT: PAIN_FUNCTIONAL_ASSESSMENT: 0-10

## 2024-04-29 NOTE — PROGRESS NOTES
"Physical Therapy    Physical Therapy Treatment    Patient Name: Aneesh Obrien  MRN: [id number]  Today's Date: 4/29/2024  Time Calculation  Start Time: 0832  Stop Time: 0915  Time Calculation (min): 43 min  PT Therapeutic Procedures Time Entry  Manual Therapy Time Entry: 10  Therapeutic Exercise Time Entry: 33  Payor: [facility] BHAKTI / Plan: [facility] PLAN / Product Type: *No Product type* /     Reason for Referral: Neck pain  General Comment: Visit 5 (5 of 12)    Current Problem    Problem List Items Addressed This Visit    None  Visit Diagnoses         Codes    Acute cervical radiculopathy    -  Primary M54.12    Degeneration of intervertebral disc of cervical region     M50.30    Spinal stenosis, cervical region     M48.02    Herniated nucleus pulposus, cervical     M50.20             Subjective   Pt reports neck still feels \"tight\".  Pain still pain running down L>R UE's to hands but seems to be less frequent.  Ref'd baseball games yesterday and surprisingly did well, voiced feeling like it loosened him up.   Compliance with HEP-yes                 Precautions  Precautions  Precautions Comment: none    Pain  Pain Assessment: 0-10  Pain Score: 4  Pain Location: Neck  Pain Orientation: Right, Left (L>R)  Pain Radiating Towards: BL UE's to hands  Pain Descriptors: Tightness  Pain Frequency: Intermittent    Objective   Cervical Rotation R 55 *                   L 55*    Treatments:  Therapeutic exercise: x  UBE L1 x 5 min   Cervical flex with self overpressure 2 x 10- NE on UE symptoms   Low pec stretch at doorway 3 x 20 sec ea  R Wrist extensors stretch 2 x 30 sec added to HEP   T/S ext with arms below chest level with 1/2 foam 2x10   2 way posture iso 10 sec x 3   BL shoulder ER YTB x20 added to HEP   BL horiz abd RTB 2 x 10 added to HEP   BL shoulder flex to 90* 3 sec hold 2x10 2#  BL shoulder scap to 90* 3 sec hold 2x10 2#  BL shoulder abd to 90* 3 sec hold 2 x 10 2#  Mod prone on " RSB shoulder ext 2 sec hold x10 2# pain free range - increased periphalization in L UE   T-bar ext 5# 2 x 10   T-bar flex 5# 2 x 10    Cervical chava with ball at wall 10 x ea flex, ext, S/B R/L     Manual Tx  Manual cervical traction intermittent pull, suboccipital release, Manual UT/Lev scap stretches    Informed consent given on manual treatment. Pt given opportunity to cease treatment at any time. Educated pt on expected soreness, possible ecchymosis. Pt voiced understanding  No adverse effects were noted post tx.      Assessment:  L UE symptoms unchanged with exercises.   Tolerated exercises well without complaints of increased neck pain. However did experience periphalization with mod prone exercises so discontinued.   Required VC's for posture and neck position otherwise performed at good pace.   Voiced relief with wrist extensor stretch and manual tx.  Updated HEP.    Plan:  Continue to progress strengthening and posture.     Access Code: [email or web address]  URL: [email or web address]  Date: 04/29/2024  Prepared by: Ana oWrkman    Exercises  - Seated Cervical Flexion AROM  - 2 x daily - 7 x weekly - 2 sets - 10 reps  - Seated Cervical Flexion Stretch with Finger Support Behind Neck  - 2 x daily - 7 x weekly - 2 sets - 10 reps  - Seated Upper Trapezius Stretch  - 2 x daily - 7 x weekly - 1 sets - 3 reps - 20 sec hold  - Shoulder External Rotation and Scapular Retraction with Resistance  - 1 x daily - 7 x weekly - 2-3 sets - 10 reps  - Seated Shoulder Horizontal Abduction with Resistance  - 1 x daily - 7 x weekly - 2 sets - 10 reps  - Standing Shoulder Row with Anchored Resistance  - 1 x daily - 7 x weekly - 2 sets - 10 reps  - Shoulder extension with resistance - Neutral  - 1 x daily - 7 x weekly - 2 sets - 10 reps  - Standing Wrist Extensor Stretch with Arm Straight  - 1 x daily - 7 x weekly - 2 sets - 30 sec hold  - Doorway Pec Stretch at 120 Degrees Abduction  - 1 x daily - 7 x weekly - 2  sets - 30 sec  hold    Goals:  Active       PT Problem       Reduce pain at worst to 3-4/10 with all prior activity.        Start:  04/09/24    Expected End:  04/24/24            Pt to sit with good posture approx 50-75% of the time.        Start:  04/09/24    Expected End:  04/24/24            Reduce pain at worst to 1-2/10 with all prior activity.        Start:  04/09/24    Expected End:  05/21/24            Increase cervical flexion to 60 degrees, ext to 55 degrees, rotation bilat to 75 degrees for posture, driving and self care.        Start:  04/09/24    Expected End:  05/21/24            Pt to have increased shoulder and scapular strength by 1/2 to full MMT grade for improved ADL's, lifting and postural support.        Start:  04/09/24    Expected End:  05/21/24            Pt to have NDI score decreased by 10% to display increased overall function.        Start:  04/09/24    Expected End:  05/21/24            Pt to be independent with a HEP for self management.        Start:  04/09/24    Expected End:  05/21/24

## 2024-05-02 ENCOUNTER — TREATMENT (OUTPATIENT)
Dept: PHYSICAL THERAPY | Facility: CLINIC | Age: 49
End: 2024-05-02
Payer: MEDICAID

## 2024-05-02 DIAGNOSIS — M54.12 ACUTE CERVICAL RADICULOPATHY: ICD-10-CM

## 2024-05-02 DIAGNOSIS — M48.02 SPINAL STENOSIS, CERVICAL REGION: ICD-10-CM

## 2024-05-02 DIAGNOSIS — M50.20 HERNIATED NUCLEUS PULPOSUS, CERVICAL: ICD-10-CM

## 2024-05-02 DIAGNOSIS — M50.30 DEGENERATION OF INTERVERTEBRAL DISC OF CERVICAL REGION: ICD-10-CM

## 2024-05-02 PROCEDURE — 97140 MANUAL THERAPY 1/> REGIONS: CPT | Mod: GP | Performed by: PHYSICAL THERAPIST

## 2024-05-02 PROCEDURE — 97110 THERAPEUTIC EXERCISES: CPT | Mod: GP | Performed by: PHYSICAL THERAPIST

## 2024-05-02 NOTE — PROGRESS NOTES
Physical Therapy    Physical Therapy Treatment    Patient Name: Aneesh Obrien  MRN: [id number]  Today's Date: 5/2/2024  Time Calculation  Start Time: 0746  Stop Time: 0832  Time Calculation (min): 46 min  PT Therapeutic Procedures Time Entry  Manual Therapy Time Entry: 10  Therapeutic Exercise Time Entry: 36  Payor: [facility] BHAKTI / Plan: [facility] PLAN / Product Type: *No Product type* /          Current Problem    Problem List Items Addressed This Visit             ICD-10-CM    Acute cervical radiculopathy M54.12    Degeneration of intervertebral disc of cervical region M50.30    Spinal stenosis, cervical region M48.02    Herniated nucleus pulposus, cervical M50.20        Subjective   Pt reports his neck is still very tight.  Reports he has had many back surgeries and his neck feels the same.   Compliance with HEP-yes                 Precautions       Pain       Objective   Cervical Rotation R 55 *                   L 55*    Treatments:  Therapeutic exercise: x  UBE L1 x 5 min   Cervical flex with self overpressure 2 x 10- NE on UE symptoms   Low pec stretch at doorway 3 x 20 sec ea  R Wrist extensors stretch 2 x 30 sec added to HEP   T/S ext with arms below chest level with 1/2 foam 2x10   2 way posture iso 10 sec x 3   BL shoulder ER YTB x20 added to HEP   BL horiz abd RTB 2 x 10 added to HEP   BL shoulder flex to 90* 3 sec hold 2x10 2#  BL shoulder scap to 90* 3 sec hold 2x10 2#  BL shoulder abd to 90* 3 sec hold 2 x 10 2#  Mod prone on RSB shoulder ext 2 sec hold x10 2# pain free range - increased periphalization in L UE   T-bar ext 5# 2 x 10   T-bar flex 5# 2 x 10    Cervical chava with ball at wall 10 x ea flex, ext, S/B R/L     Manual Tx  Manual cervical traction intermittent pull, suboccipital release, Manual UT/Lev scap stretches    Informed consent given on manual treatment. Pt given opportunity to cease treatment at any time. Educated pt on expected soreness, possible  ecchymosis. Pt voiced understanding  No adverse effects were noted post tx.      Assessment:  Pt able to complete exercises without much difficulty.  Reports manual treatment seems to help.  He likes the hands on versus traction that he got at another clinic.  Did have muscle guarding which reduced his PROM. Will benefit from further manual therapy to address  Plan:  Continue to progress strengthening and posture.     Access Code: [email or web address]  URL: [email or web address]  Date: 04/29/2024  Prepared by: Ana Workman    Exercises  - Seated Cervical Flexion AROM  - 2 x daily - 7 x weekly - 2 sets - 10 reps  - Seated Cervical Flexion Stretch with Finger Support Behind Neck  - 2 x daily - 7 x weekly - 2 sets - 10 reps  - Seated Upper Trapezius Stretch  - 2 x daily - 7 x weekly - 1 sets - 3 reps - 20 sec hold  - Shoulder External Rotation and Scapular Retraction with Resistance  - 1 x daily - 7 x weekly - 2-3 sets - 10 reps  - Seated Shoulder Horizontal Abduction with Resistance  - 1 x daily - 7 x weekly - 2 sets - 10 reps  - Standing Shoulder Row with Anchored Resistance  - 1 x daily - 7 x weekly - 2 sets - 10 reps  - Shoulder extension with resistance - Neutral  - 1 x daily - 7 x weekly - 2 sets - 10 reps  - Standing Wrist Extensor Stretch with Arm Straight  - 1 x daily - 7 x weekly - 2 sets - 30 sec hold  - Doorway Pec Stretch at 120 Degrees Abduction  - 1 x daily - 7 x weekly - 2 sets - 30 sec  hold    Goals:  Active       PT Problem       Reduce pain at worst to 3-4/10 with all prior activity.        Start:  04/09/24    Expected End:  04/24/24            Pt to sit with good posture approx 50-75% of the time.        Start:  04/09/24    Expected End:  04/24/24            Reduce pain at worst to 1-2/10 with all prior activity.        Start:  04/09/24    Expected End:  05/21/24            Increase cervical flexion to 60 degrees, ext to 55 degrees, rotation bilat to 75 degrees for posture, driving  and self care.        Start:  04/09/24    Expected End:  05/21/24            Pt to have increased shoulder and scapular strength by 1/2 to full MMT grade for improved ADL's, lifting and postural support.        Start:  04/09/24    Expected End:  05/21/24            Pt to have NDI score decreased by 10% to display increased overall function.        Start:  04/09/24    Expected End:  05/21/24            Pt to be independent with a HEP for self management.        Start:  04/09/24    Expected End:  05/21/24

## 2024-05-07 ENCOUNTER — APPOINTMENT (OUTPATIENT)
Dept: PHYSICAL THERAPY | Facility: CLINIC | Age: 49
End: 2024-05-07
Payer: MEDICAID

## 2024-05-07 ENCOUNTER — DOCUMENTATION (OUTPATIENT)
Dept: PHYSICAL THERAPY | Facility: CLINIC | Age: 49
End: 2024-05-07

## 2024-05-07 NOTE — PROGRESS NOTES
Physical Therapy                 Therapy Communication Note    Patient Name: Aneesh Obrien  MRN: [id number]  Today's Date: 5/7/2024     Discipline: Physical Therapy    Missed Visit Reason: Pt cx due to feeling ill.     Missed Time: Cancel    Comment:

## 2024-05-09 ENCOUNTER — DOCUMENTATION (OUTPATIENT)
Dept: PHYSICAL THERAPY | Facility: CLINIC | Age: 49
End: 2024-05-09

## 2024-05-09 ENCOUNTER — APPOINTMENT (OUTPATIENT)
Dept: PHYSICAL THERAPY | Facility: CLINIC | Age: 49
End: 2024-05-09
Payer: MEDICAID

## 2024-05-09 NOTE — PROGRESS NOTES
Physical Therapy                 Therapy Communication Note    Patient Name: Aneesh Obrien  MRN: [id number]  Today's Date: 5/9/2024     Discipline: Physical Therapy    Missed Visit Reason:  Pt cx due to still being ill.     Missed Time: Cancel    Comment:

## 2024-05-14 ENCOUNTER — TREATMENT (OUTPATIENT)
Dept: PHYSICAL THERAPY | Facility: CLINIC | Age: 49
End: 2024-05-14
Payer: MEDICAID

## 2024-05-14 DIAGNOSIS — M54.12 ACUTE CERVICAL RADICULOPATHY: ICD-10-CM

## 2024-05-14 DIAGNOSIS — M50.30 DEGENERATION OF INTERVERTEBRAL DISC OF CERVICAL REGION: ICD-10-CM

## 2024-05-14 DIAGNOSIS — M48.02 SPINAL STENOSIS, CERVICAL REGION: ICD-10-CM

## 2024-05-14 DIAGNOSIS — M50.20 HERNIATED NUCLEUS PULPOSUS, CERVICAL: ICD-10-CM

## 2024-05-14 PROCEDURE — 97140 MANUAL THERAPY 1/> REGIONS: CPT | Mod: GP | Performed by: PHYSICAL THERAPIST

## 2024-05-14 PROCEDURE — 97110 THERAPEUTIC EXERCISES: CPT | Mod: GP | Performed by: PHYSICAL THERAPIST

## 2024-05-14 ASSESSMENT — PAIN SCALES - GENERAL: PAINLEVEL_OUTOF10: 4

## 2024-05-14 ASSESSMENT — PAIN - FUNCTIONAL ASSESSMENT: PAIN_FUNCTIONAL_ASSESSMENT: 0-10

## 2024-05-14 NOTE — PROGRESS NOTES
Physical Therapy    Physical Therapy Treatment    Patient Name: Aneesh Obrien  MRN: [id number]  Today's Date: 5/14/2024  Time Calculation  Start Time: 0745  Stop Time: 0830  Time Calculation (min): 45 min  PT Therapeutic Procedures Time Entry  Manual Therapy Time Entry: 10  Therapeutic Exercise Time Entry: 35  Payor: [facility]AKTI / Plan: [facility] PLAN / Product Type: *No Product type* /     Reason for Referral: Neck pain  General Comment: Visit 7 (7 of 12)    Current Problem    Problem List Items Addressed This Visit             ICD-10-CM    Acute cervical radiculopathy M54.12    Degeneration of intervertebral disc of cervical region M50.30    Spinal stenosis, cervical region M48.02    Herniated nucleus pulposus, cervical M50.20        Subjective   Pt note she did not feel well last week and did not complete his exercises as much. Notes feeling more stiff.   Compliance with HEP-partial due to feeling ill.     Precautions  Precautions  Precautions Comment: none    Pain  Pain Assessment: 0-10  Pain Score: 4  Pain Location: Neck  Pain Orientation: Right, Left    Objective   No measures today     Treatments:  Therapeutic exercise: x  UBE L1 x 5 min   Cervical flex with self overpressure 2 x 10- NE on UE symptoms   Low pec stretch at doorway 3 x 20 sec ea  T/S ext with arms below chest level with 1/2 foam 2x10   2 way posture iso 10 sec x 3   BL shoulder ER YTB x20 added to HEP   BL horiz abd RTB 2 x 10 added to HEP   BL shoulder flex to 90* 3 sec hold 2x10 2#  BL shoulder scap to 90* 3 sec hold 2x10 2#  BL shoulder abd to 90* 3 sec hold 2 x 10 2#  Mod prone on RSB shoulder ext 2 sec hold x10 2# pain free range, prone rows 2# 2 x 10   T-bar ext 5# 2 x 10   T-bar flex 5# 2 x 10  Cervical chava with ball at wall 2 x 10 ea flex, ext, S/B R/L     Manual Tx  Manual cervical traction intermittent pull, suboccipital release, Manual UT/Lev scap stretches    Informed consent given on manual  treatment. Pt given opportunity to cease treatment at any time. Educated pt on expected soreness, possible ecchymosis. Pt voiced understanding  No adverse effects were noted post tx.       Assessment:   Pt overall progressed with program today with prone rows over ball and had no peripheralization of his symptoms. Pt noted less overall pain in the cervical spine post manual treatment.     Plan:   Continue to progress prone over the ball exercises for scapular strengthening     Goals:  Active       PT Problem       Reduce pain at worst to 3-4/10 with all prior activity.        Start:  04/09/24    Expected End:  04/24/24            Pt to sit with good posture approx 50-75% of the time.        Start:  04/09/24    Expected End:  04/24/24            Reduce pain at worst to 1-2/10 with all prior activity.        Start:  04/09/24    Expected End:  05/21/24            Increase cervical flexion to 60 degrees, ext to 55 degrees, rotation bilat to 75 degrees for posture, driving and self care.        Start:  04/09/24    Expected End:  05/21/24            Pt to have increased shoulder and scapular strength by 1/2 to full MMT grade for improved ADL's, lifting and postural support.        Start:  04/09/24    Expected End:  05/21/24            Pt to have NDI score decreased by 10% to display increased overall function.        Start:  04/09/24    Expected End:  05/21/24            Pt to be independent with a HEP for self management.        Start:  04/09/24    Expected End:  05/21/24

## 2024-05-16 ENCOUNTER — TREATMENT (OUTPATIENT)
Dept: PHYSICAL THERAPY | Facility: CLINIC | Age: 49
End: 2024-05-16
Payer: MEDICAID

## 2024-05-16 DIAGNOSIS — M48.02 SPINAL STENOSIS, CERVICAL REGION: ICD-10-CM

## 2024-05-16 DIAGNOSIS — M50.20 HERNIATED NUCLEUS PULPOSUS, CERVICAL: ICD-10-CM

## 2024-05-16 DIAGNOSIS — M54.12 ACUTE CERVICAL RADICULOPATHY: Primary | ICD-10-CM

## 2024-05-16 DIAGNOSIS — M50.30 DEGENERATION OF INTERVERTEBRAL DISC OF CERVICAL REGION: ICD-10-CM

## 2024-05-16 PROCEDURE — 97140 MANUAL THERAPY 1/> REGIONS: CPT | Mod: GP | Performed by: PHYSICAL THERAPIST

## 2024-05-16 PROCEDURE — 97110 THERAPEUTIC EXERCISES: CPT | Mod: GP | Performed by: PHYSICAL THERAPIST

## 2024-05-16 ASSESSMENT — PAIN - FUNCTIONAL ASSESSMENT: PAIN_FUNCTIONAL_ASSESSMENT: 0-10

## 2024-05-16 ASSESSMENT — PAIN SCALES - GENERAL: PAINLEVEL_OUTOF10: 4

## 2024-05-16 NOTE — PROGRESS NOTES
Physical Therapy    Physical Therapy Treatment    Patient Name: Aneesh Obrien  MRN: [id number]  Today's Date: 5/16/2024  Time Calculation  Start Time: 0745  Stop Time: 0830  Time Calculation (min): 45 min  PT Therapeutic Procedures Time Entry  Manual Therapy Time Entry: 10  Therapeutic Exercise Time Entry: 35  Payor: [facility]AKTI / Plan: [facility] PLAN / Product Type: *No Product type* /     Reason for Referral: Neck pain  General Comment: Visit 8 (8 of 12)    Current Problem    Problem List Items Addressed This Visit             ICD-10-CM    Acute cervical radiculopathy - Primary M54.12    Degeneration of intervertebral disc of cervical region M50.30    Spinal stenosis, cervical region M48.02    Herniated nucleus pulposus, cervical M50.20      Subjective   Pt overall feels the exercises help but still getting soreness and pain in the B cervical spine out the shoulders.   Compliance with HEP-yes    Precautions  Precautions  Precautions Comment: none    Pain  Pain Assessment: 0-10  Pain Score: 4  Pain Location: Neck  Pain Orientation: Right, Left    Objective     Treatments:  Therapeutic exercise: x  UBE L1 x 5 min   Cervical flex with self overpressure 2 x 10- NE on UE symptoms   Low pec stretch at doorway 3 x 20 sec ea  T/S ext with arms below chest level with 1/2 foam 2x10   2 way posture iso 10 sec x 3   BL shoulder flex to 90* 3 sec hold 2x10 2#  BL shoulder scap to 90* 3 sec hold 2x10 2#  BL shoulder abd to 90* 3 sec hold 2 x 10 2#  Mod prone on RSB shoulder ext 2 sec hold x10 2# pain free range, prone rows 2# 2 x 10   T-bar ext 5# 2 x 10   T-bar flex 5# 2 x 10  Cervical chava with ball at wall 2 x 10 ea flex, ext, S/B R/L     Manual Tx  Manual cervical traction intermittent pull, suboccipital release, Manual UT/Lev scap stretches    Informed consent given on manual treatment. Pt given opportunity to cease treatment at any time. Educated pt on expected soreness, possible  ecchymosis. Pt voiced understanding  No adverse effects were noted post tx.         Assessment:   Pt overall improving with postaural strength and function but still getting some soreness and stiffness in his neck. Overall slow progression is noted.     Plan:   Continue 2 more visits then recheck.     Goals:  Active       PT Problem       Reduce pain at worst to 3-4/10 with all prior activity.  (Met)       Start:  04/09/24    Expected End:  04/24/24    Resolved:  05/16/24         Pt to sit with good posture approx 50-75% of the time.  (Met)       Start:  04/09/24    Expected End:  04/24/24    Resolved:  05/16/24         Reduce pain at worst to 1-2/10 with all prior activity.        Start:  04/09/24    Expected End:  05/21/24            Increase cervical flexion to 60 degrees, ext to 55 degrees, rotation bilat to 75 degrees for posture, driving and self care.        Start:  04/09/24    Expected End:  05/21/24            Pt to have increased shoulder and scapular strength by 1/2 to full MMT grade for improved ADL's, lifting and postural support.        Start:  04/09/24    Expected End:  05/21/24            Pt to have NDI score decreased by 10% to display increased overall function.        Start:  04/09/24    Expected End:  05/21/24            Pt to be independent with a HEP for self management.        Start:  04/09/24    Expected End:  05/21/24

## 2024-05-20 ENCOUNTER — TREATMENT (OUTPATIENT)
Dept: PHYSICAL THERAPY | Facility: CLINIC | Age: 49
End: 2024-05-20
Payer: MEDICAID

## 2024-05-20 DIAGNOSIS — M50.30 DEGENERATION OF INTERVERTEBRAL DISC OF CERVICAL REGION: ICD-10-CM

## 2024-05-20 DIAGNOSIS — M48.02 SPINAL STENOSIS, CERVICAL REGION: ICD-10-CM

## 2024-05-20 DIAGNOSIS — M54.12 ACUTE CERVICAL RADICULOPATHY: Primary | ICD-10-CM

## 2024-05-20 DIAGNOSIS — M50.20 HERNIATED NUCLEUS PULPOSUS, CERVICAL: ICD-10-CM

## 2024-05-20 PROCEDURE — 97140 MANUAL THERAPY 1/> REGIONS: CPT | Mod: GP | Performed by: PHYSICAL THERAPIST

## 2024-05-20 PROCEDURE — 97110 THERAPEUTIC EXERCISES: CPT | Mod: GP | Performed by: PHYSICAL THERAPIST

## 2024-05-20 ASSESSMENT — PAIN - FUNCTIONAL ASSESSMENT: PAIN_FUNCTIONAL_ASSESSMENT: 0-10

## 2024-05-20 ASSESSMENT — PAIN SCALES - GENERAL: PAINLEVEL_OUTOF10: 3

## 2024-05-20 NOTE — PROGRESS NOTES
Physical Therapy    Physical Therapy Treatment    Patient Name: Aneesh Obrien  MRN: [id number]  Today's Date: 5/20/2024  Time Calculation  Start Time: 1615  Stop Time: 1700  Time Calculation (min): 45 min  PT Therapeutic Procedures Time Entry  Manual Therapy Time Entry: 10  Therapeutic Exercise Time Entry: 35  Payor: [facility] BHAKTI / Plan: [facility] PLAN / Product Type: *No Product type* /     Reason for Referral: Neck pain  General Comment: Visit 9 (9 of 12)    Current Problem    Problem List Items Addressed This Visit             ICD-10-CM    Acute cervical radiculopathy - Primary M54.12    Degeneration of intervertebral disc of cervical region M50.30    Spinal stenosis, cervical region M48.02    Herniated nucleus pulposus, cervical M50.20        Subjective   Notes overall he feels his neck is a little better today. Notes pain at 3/10.   Compliance with HEP-yes    Precautions  Precautions  Precautions Comment: none    Pain  Pain Assessment: 0-10  Pain Score: 3  Pain Location: Neck  Pain Orientation: Right, Left    Objective   No  measures     Treatments:  Therapeutic exercise: x  UBE L1 x 5 min   Cervical flex with self overpressure 2 x 10- NE on UE symptoms   Low pec stretch at doorway 3 x 20 sec ea  T/S ext with arms below chest level with 1/2 foam 2x10   2 way posture iso 10 sec x 3   BL shoulder flex to 90* 3 sec hold 2x10 2#  BL shoulder scap to 90* 3 sec hold 2x10 2#  BL shoulder abd to 90* 3 sec hold 2 x 10 2#  Mod prone on RSB shoulder ext 2 sec hold x10 2# pain free range, prone rows 2# 2 x 10   T-bar ext 5# 2 x 10   T-bar flex 5# 2 x 10  Cervical chava with ball at wall 2 x 10 ea flex, ext, S/B R/L     Manual Tx  Manual cervical traction intermittent pull, suboccipital release, Manual UT/Lev scap stretches    Informed consent given on manual treatment. Pt given opportunity to cease treatment at any time. Educated pt on expected soreness, possible ecchymosis. Pt voiced  understanding  No adverse effects were noted post tx.         Manual Tx:    Informed consent given on manual treatment. Pt given opportunity to cease treatment at any time. Educated pt on expected soreness, possible ecchymosis. Pt voiced understanding  No adverse effects were noted post tx.      Assessment:   Pt overall progressing with his strength in the UE's as well as posture. Reducing pain is noted but slow improvements overall.     Plan:   1 more visit with re-check.     Goals:  Active       PT Problem       Reduce pain at worst to 3-4/10 with all prior activity.  (Met)       Start:  04/09/24    Expected End:  04/24/24    Resolved:  05/16/24         Pt to sit with good posture approx 50-75% of the time.  (Met)       Start:  04/09/24    Expected End:  04/24/24    Resolved:  05/16/24         Reduce pain at worst to 1-2/10 with all prior activity.        Start:  04/09/24    Expected End:  05/21/24            Increase cervical flexion to 60 degrees, ext to 55 degrees, rotation bilat to 75 degrees for posture, driving and self care.        Start:  04/09/24    Expected End:  05/21/24            Pt to have increased shoulder and scapular strength by 1/2 to full MMT grade for improved ADL's, lifting and postural support.        Start:  04/09/24    Expected End:  05/21/24            Pt to have NDI score decreased by 10% to display increased overall function.        Start:  04/09/24    Expected End:  05/21/24            Pt to be independent with a HEP for self management.        Start:  04/09/24    Expected End:  05/21/24

## 2024-05-23 ENCOUNTER — TREATMENT (OUTPATIENT)
Dept: PHYSICAL THERAPY | Facility: CLINIC | Age: 49
End: 2024-05-23
Payer: MEDICAID

## 2024-05-23 DIAGNOSIS — M50.20 HERNIATED NUCLEUS PULPOSUS, CERVICAL: ICD-10-CM

## 2024-05-23 DIAGNOSIS — M50.30 DEGENERATION OF INTERVERTEBRAL DISC OF CERVICAL REGION: ICD-10-CM

## 2024-05-23 DIAGNOSIS — M54.12 ACUTE CERVICAL RADICULOPATHY: Primary | ICD-10-CM

## 2024-05-23 DIAGNOSIS — M48.02 SPINAL STENOSIS, CERVICAL REGION: ICD-10-CM

## 2024-05-23 PROCEDURE — 97140 MANUAL THERAPY 1/> REGIONS: CPT | Mod: GP | Performed by: PHYSICAL THERAPIST

## 2024-05-23 PROCEDURE — 97110 THERAPEUTIC EXERCISES: CPT | Mod: GP | Performed by: PHYSICAL THERAPIST

## 2024-05-23 ASSESSMENT — PAIN SCALES - GENERAL: PAINLEVEL_OUTOF10: 4

## 2024-05-23 ASSESSMENT — PAIN - FUNCTIONAL ASSESSMENT: PAIN_FUNCTIONAL_ASSESSMENT: 0-10

## 2024-05-23 NOTE — PROGRESS NOTES
Physical Therapy    Physical Therapy Treatment/Discharge     Patient Name: Aneesh Obrien  MRN: [id number]  Today's Date: 5/23/2024  Time Calculation  Start Time: 0745  Stop Time: 0830  Time Calculation (min): 45 min  PT Therapeutic Procedures Time Entry  Manual Therapy Time Entry: 10  Therapeutic Exercise Time Entry: 35  Payor: [facility] BHAKTI / Plan: [facility] PLAN / Product Type: *No Product type* /     Reason for Referral: Neck pain  General Comment: Visit 10 (10 of 12)    Current Problem    Problem List Items Addressed This Visit             ICD-10-CM    Acute cervical radiculopathy - Primary M54.12    Degeneration of intervertebral disc of cervical region M50.30    Spinal stenosis, cervical region M48.02    Herniated nucleus pulposus, cervical M50.20        Subjective   Pt overall notes she has a lot of stiffness this AM in his neck due to help in his nephew push his ca rout of the street yesterday. Notes some improvements in pain but pain can fluctuate yet.   Compliance with HEP-yes    Precautions  Precautions  STEADI Fall Risk Score (The score of 4 or more indicates an increased risk of falling): 0  Precautions Comment: none    Pain  Pain Assessment: 0-10  Pain Score: 4  Pain Location: Neck  Pain Orientation: Right, Left    Objective   Upper extremity ROM: WNL Unless documented below:      Upper Extremity Strength:   RIGHT LEFT   Shoulder Flexion 5 5   Shoulder Abduction 5 5   Shoulder ER 5 5   Shoulder IR 5 5   Elbow Flexion 5 5   Elbow Extension 5 5     Cervical ROM:  Date: eval ROM in Degrees    Flexion 45    Extension 45     RIGHT LEFT   Side bend 20 20   Rotation 55 55     Other Measures  Lower Extremity Funtional Score (LEFS): 30%     Treatments:  Therapeutic exercise: x 35  UBE L1 x 5 min   Cervical flex with self overpressure 2 x 10- NE on UE symptoms   Low pec stretch at doorway 3 x 20 sec ea  T/S ext with arms below chest level with 1/2 foam 2x10   2 way posture iso  10 sec x 3   BL shoulder flex to 90* 3 sec hold 2x10 2#  BL shoulder scap to 90* 3 sec hold 2x10 2#  BL shoulder abd to 90* 3 sec hold 2 x 10 2#  Mod prone on RSB shoulder ext 2 sec hold x10 2# pain free range, prone rows 2# 2 x 10   T-bar ext 5# 2 x 10   T-bar flex 5# 2 x 10  Cervical chava with ball at wall 2 x 10 ea flex, ext, S/B R/L     Updated HEP  Access Code: [email or web address]  URL: [email or web address]  Date: 05/23/2024  Prepared by:  Dunia    Exercises  - Seated Cervical Flexion AROM  - 2 x daily - 7 x weekly - 2 sets - 10 reps  - Seated Cervical Flexion Stretch with Finger Support Behind Neck  - 2 x daily - 7 x weekly - 2 sets - 10 reps  - Seated Upper Trapezius Stretch  - 2 x daily - 7 x weekly - 1 sets - 3 reps - 20 sec hold  - Shoulder External Rotation and Scapular Retraction with Resistance  - 1 x daily - 4 x weekly - 2-3 sets - 10 reps  - Seated Shoulder Horizontal Abduction with Resistance  - 1 x daily - 4 x weekly - 2 sets - 10 reps  - Standing Shoulder Row with Anchored Resistance  - 1 x daily - 4 x weekly - 2 sets - 10 reps  - Shoulder extension with resistance - Neutral  - 1 x daily - 4 x weekly - 2 sets - 10 reps  - Standing Wrist Extensor Stretch with Arm Straight  - 1 x daily - 7 x weekly - 2 sets - 30 sec hold  - Doorway Pec Stretch at 120 Degrees Abduction  - 1 x daily - 7 x weekly - 2 sets - 30 sec  hold  - Standing Shoulder Flexion to 90 Degrees with Dumbbells  - 1 x daily - 4 x weekly - 2 sets - 10 reps  - Shoulder Abduction with Dumbbells - Thumbs Up  - 1 x daily - 4 x weekly - 2 sets - 10 reps  - Scaption with Dumbbells  - 1 x daily - 4 x weekly - 2 sets - 10 reps    Manual Tx x 10 min   Manual cervical traction intermittent pull, suboccipital release, Manual UT/Lev scap stretches    Informed consent given on manual treatment. Pt given opportunity to cease treatment at any time. Educated pt on expected soreness, possible ecchymosis. Pt voiced understanding  No  adverse effects were noted post tx.         Assessment:   Pt overall has improved with his ROM and strength but still limited due to ongoing pain in the cervical spine. Pt has improve some functionally as well but still limited. Pt has met approx 75% of his LTG's and is recommended at this time to hold on therapy since no significant changes in overall pain are being noted. Pt has a fair prognosis going forward with HEP performance and is recommended to return to PCP for follow up regarding pain.     Plan:   Discharged to HEP today.     Goals:  Resolved       PT Problem       Reduce pain at worst to 3-4/10 with all prior activity.  (Met)       Start:  04/09/24    Expected End:  04/24/24    Resolved:  05/16/24         Pt to sit with good posture approx 50-75% of the time.  (Met)       Start:  04/09/24    Expected End:  04/24/24    Resolved:  05/16/24         Reduce pain at worst to 1-2/10 with all prior activity.  (Adequate for Discharge)       Start:  04/09/24    Expected End:  05/21/24            Increase cervical flexion to 60 degrees, ext to 55 degrees, rotation bilat to 75 degrees for posture, driving and self care.  (Adequate for Discharge)       Start:  04/09/24    Expected End:  05/21/24            Pt to have increased shoulder and scapular strength by 1/2 to full MMT grade for improved ADL's, lifting and postural support.  (Met)       Start:  04/09/24    Expected End:  05/21/24    Resolved:  05/23/24         Pt to have NDI score decreased by 10% to display increased overall function.  (Not met)       Start:  04/09/24    Expected End:  05/21/24    Resolved:  05/23/24         Pt to be independent with a HEP for self management.  (Met)       Start:  04/09/24    Expected End:  05/21/24    Resolved:  05/23/24

## 2024-05-31 ENCOUNTER — OFFICE VISIT (OUTPATIENT)
Dept: PRIMARY CARE | Facility: CLINIC | Age: 49
End: 2024-05-31
Payer: MEDICAID

## 2024-05-31 VITALS
TEMPERATURE: 97.9 F | HEIGHT: 70 IN | SYSTOLIC BLOOD PRESSURE: 124 MMHG | OXYGEN SATURATION: 98 % | DIASTOLIC BLOOD PRESSURE: 72 MMHG | WEIGHT: 234 LBS | HEART RATE: 68 BPM | BODY MASS INDEX: 33.5 KG/M2

## 2024-05-31 DIAGNOSIS — M54.12 ACUTE CERVICAL RADICULOPATHY: ICD-10-CM

## 2024-05-31 DIAGNOSIS — M48.02 SPINAL STENOSIS, CERVICAL REGION: ICD-10-CM

## 2024-05-31 DIAGNOSIS — J30.1 SEASONAL ALLERGIC RHINITIS DUE TO POLLEN: Primary | ICD-10-CM

## 2024-05-31 DIAGNOSIS — M50.30 DEGENERATION OF INTERVERTEBRAL DISC OF CERVICAL REGION: ICD-10-CM

## 2024-05-31 PROCEDURE — 96372 THER/PROPH/DIAG INJ SC/IM: CPT | Performed by: FAMILY MEDICINE

## 2024-05-31 PROCEDURE — 99215 OFFICE O/P EST HI 40 MIN: CPT | Performed by: FAMILY MEDICINE

## 2024-05-31 PROCEDURE — 1036F TOBACCO NON-USER: CPT | Performed by: FAMILY MEDICINE

## 2024-05-31 RX ORDER — LORATADINE 10 MG/1
10 TABLET ORAL DAILY
Qty: 30 TABLET | Refills: 2 | Status: SHIPPED | OUTPATIENT
Start: 2024-05-31 | End: 2024-08-29

## 2024-05-31 RX ORDER — TRIAMCINOLONE ACETONIDE 40 MG/ML
40 INJECTION, SUSPENSION INTRA-ARTICULAR; INTRAMUSCULAR ONCE
Status: COMPLETED | OUTPATIENT
Start: 2024-05-31 | End: 2024-05-31

## 2024-05-31 RX ORDER — MOMETASONE FUROATE 50 UG/1
2 SPRAY, METERED NASAL DAILY
Qty: 17 G | Refills: 5 | Status: SHIPPED | OUTPATIENT
Start: 2024-05-31 | End: 2024-11-27

## 2024-05-31 RX ADMIN — TRIAMCINOLONE ACETONIDE 40 MG: 40 INJECTION, SUSPENSION INTRA-ARTICULAR; INTRAMUSCULAR at 12:19

## 2024-05-31 NOTE — PROGRESS NOTES
"Subjective   Patient ID: Aneesh Obrien is a 49 y.o. male who presents for Follow-up (Neck pain, Sinusitis ).    HPI Patient has completed Physical Therapy. Has continued pain. Pain in neck radiating into his arms/hands. Taking Mobic and Tizanidine. Patient like to discuss getting MRI of neck.     Also here for follow up of sinusitis. Patient continues with sinus pressure, congestin. Is taking Atrovent inhaler with no relief so he started using Afrin nasal spray a few months. States Afrin is the only medicine that works. Patient would like to discuss getting CT of sinuses.     Review of Systems   All other systems reviewed and are negative.      Objective   /72   Pulse 68   Temp 36.6 °C (97.9 °F)   Ht 1.778 m (5' 10\")   Wt 106 kg (234 lb)   SpO2 98%   BMI 33.58 kg/m²     Physical Exam  Constitutional:       Appearance: Normal appearance.   Eyes:      Conjunctiva/sclera: Conjunctivae normal.   Cardiovascular:      Rate and Rhythm: Normal rate and regular rhythm.   Pulmonary:      Effort: Pulmonary effort is normal.      Breath sounds: Normal breath sounds.   Abdominal:      Palpations: Abdomen is soft.   Musculoskeletal:         General: Normal range of motion.      Comments: Marked pain and crepitus of the cervical spine with active ROM testing. He does demonstrate limited rotation to the left and right. His  strength is normal b/l.   Skin:     General: Skin is warm and dry.   Neurological:      Mental Status: He is alert.   Psychiatric:         Mood and Affect: Mood normal.         Assessment/Plan   Diagnoses and all orders for this visit:  Seasonal allergic rhinitis due to pollen  -     triamcinolone acetonide (Kenalog-40) injection 40 mg  -     mometasone (Nasonex) 50 mcg/actuation nasal spray; Administer 2 sprays into each nostril once daily.  -     loratadine (Claritin) 10 mg tablet; Take 1 tablet (10 mg) by mouth once daily.  - STOP use of Afrin  Acute cervical radiculopathy  -     MR cervical " spine wo IV contrast; Future  Degeneration of intervertebral disc of cervical region  -     MR cervical spine wo IV contrast; Future  Spinal stenosis, cervical region  -     MR cervical spine wo IV contrast; Future  - PT completed and notes reviewed. Pt did not receive significant improvement of pain. We will go ahead with MRI and possible referral to ortho surg pending results.

## 2024-06-04 DIAGNOSIS — J30.1 SEASONAL ALLERGIC RHINITIS DUE TO POLLEN: ICD-10-CM

## 2024-06-04 NOTE — TELEPHONE ENCOUNTER
Nasonex spray is not covered by insurance, reviewed with provider, Rx of Flonase spray called into pharmacy to take place of Nasonex spray via phone with [facility] pharmacist.

## 2024-06-06 ENCOUNTER — TELEPHONE (OUTPATIENT)
Dept: PRIMARY CARE | Facility: CLINIC | Age: 49
End: 2024-06-06
Payer: MEDICAID

## 2024-06-06 RX ORDER — FLUTICASONE PROPIONATE 50 MCG
1 SPRAY, SUSPENSION (ML) NASAL DAILY
Qty: 16 G | Refills: 5 | OUTPATIENT
Start: 2024-06-06 | End: 2025-06-06

## 2024-06-19 ENCOUNTER — HOSPITAL ENCOUNTER (OUTPATIENT)
Dept: RADIOLOGY | Facility: CLINIC | Age: 49
Discharge: HOME | End: 2024-06-19
Payer: MEDICAID

## 2024-06-19 DIAGNOSIS — M50.30 DEGENERATION OF INTERVERTEBRAL DISC OF CERVICAL REGION: ICD-10-CM

## 2024-06-19 DIAGNOSIS — M54.12 ACUTE CERVICAL RADICULOPATHY: ICD-10-CM

## 2024-06-19 DIAGNOSIS — M48.02 SPINAL STENOSIS, CERVICAL REGION: ICD-10-CM

## 2024-06-19 PROCEDURE — 72141 MRI NECK SPINE W/O DYE: CPT

## 2024-06-19 PROCEDURE — 72141 MRI NECK SPINE W/O DYE: CPT | Performed by: RADIOLOGY

## 2024-06-24 ENCOUNTER — APPOINTMENT (OUTPATIENT)
Dept: PRIMARY CARE | Facility: CLINIC | Age: 49
End: 2024-06-24
Payer: MEDICAID

## 2024-06-24 VITALS
HEART RATE: 85 BPM | SYSTOLIC BLOOD PRESSURE: 145 MMHG | DIASTOLIC BLOOD PRESSURE: 79 MMHG | OXYGEN SATURATION: 97 % | BODY MASS INDEX: 32.35 KG/M2 | HEIGHT: 70 IN | WEIGHT: 226 LBS

## 2024-06-24 DIAGNOSIS — R09.81 NASAL CONGESTION: ICD-10-CM

## 2024-06-24 DIAGNOSIS — E78.5 HYPERLIPIDEMIA, UNSPECIFIED HYPERLIPIDEMIA TYPE: ICD-10-CM

## 2024-06-24 DIAGNOSIS — E11.9 TYPE 2 DIABETES MELLITUS WITHOUT COMPLICATION, WITHOUT LONG-TERM CURRENT USE OF INSULIN (MULTI): ICD-10-CM

## 2024-06-24 DIAGNOSIS — M54.2 CHRONIC NECK PAIN: ICD-10-CM

## 2024-06-24 DIAGNOSIS — M19.90 ARTHRITIS: ICD-10-CM

## 2024-06-24 DIAGNOSIS — J30.1 SEASONAL ALLERGIC RHINITIS DUE TO POLLEN: ICD-10-CM

## 2024-06-24 DIAGNOSIS — K29.00 ACUTE GASTRITIS WITHOUT HEMORRHAGE, UNSPECIFIED GASTRITIS TYPE: ICD-10-CM

## 2024-06-24 DIAGNOSIS — G89.29 CHRONIC NECK PAIN: ICD-10-CM

## 2024-06-24 PROCEDURE — 3050F LDL-C >= 130 MG/DL: CPT | Performed by: FAMILY MEDICINE

## 2024-06-24 PROCEDURE — 3077F SYST BP >= 140 MM HG: CPT | Performed by: FAMILY MEDICINE

## 2024-06-24 PROCEDURE — 99214 OFFICE O/P EST MOD 30 MIN: CPT | Performed by: FAMILY MEDICINE

## 2024-06-24 PROCEDURE — 1036F TOBACCO NON-USER: CPT | Performed by: FAMILY MEDICINE

## 2024-06-24 PROCEDURE — 3078F DIAST BP <80 MM HG: CPT | Performed by: FAMILY MEDICINE

## 2024-06-24 PROCEDURE — 3051F HG A1C>EQUAL 7.0%<8.0%: CPT | Performed by: FAMILY MEDICINE

## 2024-06-24 RX ORDER — MELOXICAM 15 MG/1
15 TABLET ORAL DAILY
Qty: 90 TABLET | Refills: 0 | Status: SHIPPED | OUTPATIENT
Start: 2024-06-24 | End: 2024-09-22

## 2024-06-24 RX ORDER — LORATADINE 10 MG/1
10 TABLET ORAL DAILY
Qty: 90 TABLET | Refills: 0 | Status: SHIPPED | OUTPATIENT
Start: 2024-06-24 | End: 2024-09-22

## 2024-06-24 RX ORDER — SITAGLIPTIN 50 MG/1
50 TABLET, FILM COATED ORAL DAILY
Qty: 90 TABLET | Refills: 0 | Status: SHIPPED | OUTPATIENT
Start: 2024-06-24 | End: 2024-09-22

## 2024-06-24 RX ORDER — TIZANIDINE 4 MG/1
4 TABLET ORAL EVERY 8 HOURS PRN
Qty: 60 TABLET | Refills: 2 | Status: SHIPPED | OUTPATIENT
Start: 2024-06-24 | End: 2024-09-22

## 2024-06-24 RX ORDER — ATORVASTATIN CALCIUM 40 MG/1
40 TABLET, FILM COATED ORAL DAILY
Qty: 90 TABLET | Refills: 0 | Status: SHIPPED | OUTPATIENT
Start: 2024-06-24 | End: 2024-09-22

## 2024-06-24 RX ORDER — OMEPRAZOLE 40 MG/1
40 CAPSULE, DELAYED RELEASE ORAL
Qty: 90 CAPSULE | Refills: 0 | Status: SHIPPED | OUTPATIENT
Start: 2024-06-24 | End: 2024-09-22

## 2024-06-24 NOTE — PROGRESS NOTES
"Subjective   Patient ID: Aneesh Obrien is a 49 y.o. male who presents for Follow-up (Neck pain. MRI results. ).    HPI Continues with neck pain.     Pt is here for refills of medications to treat the following medical conditions. Unless otherwise stated, these conditions are well-controlled, pt is taking medications s Rx, and there are no reported side effects to medications or other treatment: DM2, GERD, Hyperlipidemia. Patient will return for labs.     Review of Systems   All other systems reviewed and are negative.      Objective   /79   Pulse 85   Ht 1.778 m (5' 10\")   Wt 103 kg (226 lb)   SpO2 97%   BMI 32.43 kg/m²     Physical Exam  Constitutional:       Appearance: Normal appearance.   Eyes:      Conjunctiva/sclera: Conjunctivae normal.   Cardiovascular:      Rate and Rhythm: Normal rate and regular rhythm.   Pulmonary:      Effort: Pulmonary effort is normal.      Breath sounds: Normal breath sounds.   Abdominal:      Palpations: Abdomen is soft.   Musculoskeletal:         General: Normal range of motion.   Skin:     General: Skin is warm and dry.   Neurological:      Mental Status: He is alert.   Psychiatric:         Mood and Affect: Mood normal.         Assessment/Plan   Diagnoses and all orders for this visit:  Hyperlipidemia, unspecified hyperlipidemia type  -     atorvastatin (Lipitor) 40 mg tablet; Take 1 tablet (40 mg) by mouth once daily.  -     Lipid panel; Future  Nasal congestion  Type 2 diabetes mellitus without complication, without long-term current use of insulin (Multi)  -     Januvia 50 mg tablet; Take 1 tablet (50 mg) by mouth once daily.  -     Comprehensive metabolic panel; Future  -     Hemoglobin A1c; Future  Chronic neck pain  -     tiZANidine (Zanaflex) 4 mg tablet; Take 1 tablet (4 mg) by mouth every 8 hours if needed for muscle spasms.  Acute gastritis without hemorrhage, unspecified gastritis type  -     omeprazole (PriLOSEC) 40 mg DR capsule; Take 1 capsule (40 mg) by " mouth once daily in the morning. Take before meals. Do not crush or chew.  Arthritis  -     meloxicam (Mobic) 15 mg tablet; Take 1 tablet (15 mg) by mouth once daily.  Seasonal allergic rhinitis due to pollen  -     loratadine (Claritin) 10 mg tablet; Take 1 tablet (10 mg) by mouth once daily.

## 2024-07-05 ENCOUNTER — APPOINTMENT (OUTPATIENT)
Dept: PODIATRY | Facility: CLINIC | Age: 49
End: 2024-07-05
Payer: MEDICAID

## 2024-07-05 DIAGNOSIS — S90.112A: ICD-10-CM

## 2024-07-05 DIAGNOSIS — E11.65 POORLY CONTROLLED DIABETES MELLITUS (MULTI): ICD-10-CM

## 2024-07-05 DIAGNOSIS — S90.119A: Primary | ICD-10-CM

## 2024-07-05 PROCEDURE — 3050F LDL-C >= 130 MG/DL: CPT | Performed by: PODIATRIST

## 2024-07-05 PROCEDURE — 3051F HG A1C>EQUAL 7.0%<8.0%: CPT | Performed by: PODIATRIST

## 2024-07-05 PROCEDURE — 99212 OFFICE O/P EST SF 10 MIN: CPT | Performed by: PODIATRIST

## 2024-07-05 NOTE — PROGRESS NOTES
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 Better sugar control, hematoma of the nail is growing out.  PCP: Dr. Montejo  Last visit: 6-6-24      PMH  Medical History        Past Medical History:   Diagnosis Date    Personal history of other endocrine, nutritional and metabolic disease       History of diabetes mellitus         MEDS     Current Outpatient Medications:     atorvastatin (Lipitor) 40 mg tablet, Take 1 tablet (40 mg) by mouth once daily., Disp: 90 tablet, Rfl: 0    doxycycline (Vibramycin) 100 mg capsule, Take 1 capsule (100 mg) by mouth 2 times a day for 7 days. Take with at least 8 ounces (large glass) of water, do not lie down for 30 minutes after, Disp: 14 capsule, Rfl: 0    ibuprofen 800 mg tablet, TAKE 1 TABLET BY MOUTH EVERY 8 HOURS IF NEEDED FOR MILD PAIN, Disp: , Rfl:     ipratropium (Atrovent) 42 mcg (0.06 %) nasal spray, Administer 2 sprays into each nostril 4 times a day as needed for rhinitis., Disp: 15 mL, Rfl: 12    Januvia 50 mg tablet, Take 1 tablet (50 mg) by mouth once daily., Disp: 90 tablet, Rfl: 0    meloxicam (Mobic) 15 mg tablet, Take 1 tablet (15 mg) by mouth once daily., Disp: 90 tablet, Rfl: 0    omeprazole (PriLOSEC) 40 mg DR capsule, Take 1 capsule (40 mg) by mouth once daily in the morning. Take before meals. Do not crush or chew., Disp: 30 capsule, Rfl: 0    tiZANidine (Zanaflex) 4 mg tablet, Take 1 tablet (4 mg) by mouth every 8 hours if needed for muscle spasms., Disp: 60 tablet, Rfl: 2  Allergies  No Known Allergies  Social History   Social History            Socioeconomic History    Marital status: Single       Spouse name: None    Number of children: None    Years of education: None    Highest education level: None   Occupational History    None   Tobacco Use    Smoking status: Never    Smokeless tobacco: Never   Substance and Sexual Activity     Alcohol use: Not Currently    Drug use: Yes       Types: Marijuana    Sexual activity: None   Other Topics Concern    None   Social History Narrative    None      Social Determinants of Health      Financial Resource Strain: Not on file   Food Insecurity: Not on file   Transportation Needs: Not on file   Physical Activity: Not on file   Stress: Not on file   Social Connections: Not on file   Intimate Partner Violence: Not on file   Housing Stability: Not on file         Family History   No family history on file.     Surgical History         Past Surgical History:   Procedure Laterality Date    OTHER SURGICAL HISTORY   03/05/2021     Knee surgery    OTHER SURGICAL HISTORY   03/05/2021     Lower back surgery            REVIEW OF SYSTEMS     + as noted above in HPI.         Physical examination:   On General Observation: Patient is a pleasant, cooperative, well developed 49 y.o.  adult male. The patient is alert and oriented to time, place and person. Patient has normal affect and mood.  There were no vitals taken for this visit.     Vascular:  DP and PT pulses are 2/4 b/l.  no edema noted. no varicosities b/l.  CFT  5 seconds to all digits bilateral.  Skin temperature is warm to warm from proximal to distal bilateral.       Muscular: Strength is 5/5 b/l.  Motor strength is normal and symmetric proximally, as well as distally, in all four extremities. Good mobility of all extremities.       Neuro:  Proprioception present.   Sensation to vibration is  present. Protective sensation present  at all pedal sites via [place] Randall 5.07 monofilament bilateral.  Light touch intact bilateral.      Derm: Hematoma of the hallux nails is growing out, no lysis is present, no odor or drainage.     Ortho:  Rom stable stj, aj b/l le           ASSESSMENT:    Hematoma hallux resolving  E11.65      PLAN:   Exam  A comprehensive history and physical examination were preformed. The patient was educated on clinical findings, diagnosis  and treatment plans. Patient understands all that has been explained and all questions were answered to apparent satisfaction.   -reviewed etiology of hematoma and options, Nail bed are dry and stable. Hematoma is growing out.  DM foot exam, better sugars, no acute issues.        Sam Knapp DPM

## 2024-08-18 ENCOUNTER — HOSPITAL ENCOUNTER (EMERGENCY)
Facility: HOSPITAL | Age: 49
Discharge: HOME | End: 2024-08-18
Payer: MEDICAID

## 2024-08-18 PROCEDURE — 4500999001 HC ED NO CHARGE

## 2024-10-04 ENCOUNTER — APPOINTMENT (OUTPATIENT)
Dept: PODIATRY | Facility: CLINIC | Age: 49
End: 2024-10-04
Payer: MEDICAID

## 2024-10-04 ENCOUNTER — TELEPHONE (OUTPATIENT)
Dept: PRIMARY CARE | Facility: CLINIC | Age: 49
End: 2024-10-04

## 2024-10-07 ENCOUNTER — LAB (OUTPATIENT)
Dept: LAB | Facility: LAB | Age: 49
End: 2024-10-07
Payer: MEDICAID

## 2024-10-07 DIAGNOSIS — E78.5 HYPERLIPIDEMIA, UNSPECIFIED HYPERLIPIDEMIA TYPE: ICD-10-CM

## 2024-10-07 DIAGNOSIS — E11.9 TYPE 2 DIABETES MELLITUS WITHOUT COMPLICATION, WITHOUT LONG-TERM CURRENT USE OF INSULIN (MULTI): ICD-10-CM

## 2024-10-07 LAB
ALBUMIN SERPL BCP-MCNC: 4 G/DL (ref 3.4–5)
ALP SERPL-CCNC: 57 U/L (ref 33–120)
ALT SERPL W P-5'-P-CCNC: 18 U/L (ref 10–52)
ANION GAP SERPL CALC-SCNC: 11 MMOL/L (ref 10–20)
AST SERPL W P-5'-P-CCNC: 14 U/L (ref 9–39)
BILIRUB SERPL-MCNC: 0.6 MG/DL (ref 0–1.2)
BUN SERPL-MCNC: 10 MG/DL (ref 6–23)
CALCIUM SERPL-MCNC: 9.1 MG/DL (ref 8.6–10.3)
CHLORIDE SERPL-SCNC: 105 MMOL/L (ref 98–107)
CHOLEST SERPL-MCNC: 237 MG/DL (ref 0–199)
CHOLESTEROL/HDL RATIO: 6.4
CO2 SERPL-SCNC: 28 MMOL/L (ref 21–32)
CREAT SERPL-MCNC: 0.84 MG/DL (ref 0.5–1.3)
EGFRCR SERPLBLD CKD-EPI 2021: >90 ML/MIN/1.73M*2
GLUCOSE SERPL-MCNC: 118 MG/DL (ref 74–99)
HDLC SERPL-MCNC: 37.2 MG/DL
LDLC SERPL CALC-MCNC: 151 MG/DL
NON HDL CHOLESTEROL: 200 MG/DL (ref 0–149)
POTASSIUM SERPL-SCNC: 4.6 MMOL/L (ref 3.5–5.3)
PROT SERPL-MCNC: 7.1 G/DL (ref 6.4–8.2)
SODIUM SERPL-SCNC: 139 MMOL/L (ref 136–145)
TRIGL SERPL-MCNC: 245 MG/DL (ref 0–149)
VLDL: 49 MG/DL (ref 0–40)

## 2024-10-07 PROCEDURE — 36415 COLL VENOUS BLD VENIPUNCTURE: CPT

## 2024-10-07 PROCEDURE — 80061 LIPID PANEL: CPT

## 2024-10-07 PROCEDURE — 83036 HEMOGLOBIN GLYCOSYLATED A1C: CPT

## 2024-10-07 PROCEDURE — 80053 COMPREHEN METABOLIC PANEL: CPT

## 2024-10-08 LAB
EST. AVERAGE GLUCOSE BLD GHB EST-MCNC: 157 MG/DL
HBA1C MFR BLD: 7.1 %

## 2024-10-09 ENCOUNTER — APPOINTMENT (OUTPATIENT)
Dept: PRIMARY CARE | Facility: CLINIC | Age: 49
End: 2024-10-09
Payer: MEDICAID

## 2024-10-09 VITALS
OXYGEN SATURATION: 98 % | TEMPERATURE: 98.4 F | SYSTOLIC BLOOD PRESSURE: 112 MMHG | DIASTOLIC BLOOD PRESSURE: 74 MMHG | HEIGHT: 70 IN | WEIGHT: 222.8 LBS | HEART RATE: 74 BPM | BODY MASS INDEX: 31.9 KG/M2

## 2024-10-09 DIAGNOSIS — K29.00 ACUTE GASTRITIS WITHOUT HEMORRHAGE, UNSPECIFIED GASTRITIS TYPE: ICD-10-CM

## 2024-10-09 DIAGNOSIS — M54.2 CHRONIC NECK PAIN: ICD-10-CM

## 2024-10-09 DIAGNOSIS — E11.9 TYPE 2 DIABETES MELLITUS WITHOUT COMPLICATION, WITHOUT LONG-TERM CURRENT USE OF INSULIN (MULTI): ICD-10-CM

## 2024-10-09 DIAGNOSIS — J30.1 SEASONAL ALLERGIC RHINITIS DUE TO POLLEN: ICD-10-CM

## 2024-10-09 DIAGNOSIS — G89.29 CHRONIC NECK PAIN: ICD-10-CM

## 2024-10-09 DIAGNOSIS — E78.5 HYPERLIPIDEMIA, UNSPECIFIED HYPERLIPIDEMIA TYPE: ICD-10-CM

## 2024-10-09 PROCEDURE — 3078F DIAST BP <80 MM HG: CPT | Performed by: FAMILY MEDICINE

## 2024-10-09 PROCEDURE — 3074F SYST BP LT 130 MM HG: CPT | Performed by: FAMILY MEDICINE

## 2024-10-09 PROCEDURE — 3050F LDL-C >= 130 MG/DL: CPT | Performed by: FAMILY MEDICINE

## 2024-10-09 PROCEDURE — 99214 OFFICE O/P EST MOD 30 MIN: CPT | Performed by: FAMILY MEDICINE

## 2024-10-09 PROCEDURE — 3051F HG A1C>EQUAL 7.0%<8.0%: CPT | Performed by: FAMILY MEDICINE

## 2024-10-09 PROCEDURE — 3008F BODY MASS INDEX DOCD: CPT | Performed by: FAMILY MEDICINE

## 2024-10-09 PROCEDURE — 1036F TOBACCO NON-USER: CPT | Performed by: FAMILY MEDICINE

## 2024-10-09 RX ORDER — SITAGLIPTIN 50 MG/1
50 TABLET, FILM COATED ORAL DAILY
Qty: 90 TABLET | Refills: 0 | Status: CANCELLED | OUTPATIENT
Start: 2024-10-09 | End: 2025-01-07

## 2024-10-09 RX ORDER — TIZANIDINE 4 MG/1
4 TABLET ORAL EVERY 8 HOURS PRN
Qty: 60 TABLET | Refills: 2 | Status: SHIPPED | OUTPATIENT
Start: 2024-10-09 | End: 2025-01-07

## 2024-10-09 RX ORDER — ROSUVASTATIN CALCIUM 20 MG/1
20 TABLET, COATED ORAL DAILY
Qty: 90 TABLET | Refills: 0 | Status: SHIPPED | OUTPATIENT
Start: 2024-10-09 | End: 2025-01-07

## 2024-10-09 RX ORDER — OMEPRAZOLE 40 MG/1
40 CAPSULE, DELAYED RELEASE ORAL
Qty: 90 CAPSULE | Refills: 0 | Status: SHIPPED | OUTPATIENT
Start: 2024-10-09 | End: 2025-01-07

## 2024-10-09 RX ORDER — ATORVASTATIN CALCIUM 40 MG/1
40 TABLET, FILM COATED ORAL DAILY
Qty: 90 TABLET | Refills: 0 | Status: CANCELLED | OUTPATIENT
Start: 2024-10-09 | End: 2025-01-07

## 2024-10-09 RX ORDER — LORATADINE 10 MG/1
10 TABLET ORAL DAILY
Qty: 90 TABLET | Refills: 0 | Status: SHIPPED | OUTPATIENT
Start: 2024-10-09 | End: 2025-01-07

## 2024-10-09 ASSESSMENT — PATIENT HEALTH QUESTIONNAIRE - PHQ9
2. FEELING DOWN, DEPRESSED OR HOPELESS: NOT AT ALL
SUM OF ALL RESPONSES TO PHQ9 QUESTIONS 1 AND 2: 0
1. LITTLE INTEREST OR PLEASURE IN DOING THINGS: NOT AT ALL

## 2024-10-09 NOTE — PROGRESS NOTES
"Subjective   Patient ID: Aneesh Obrien is a 49 y.o. male who presents for Med Refill.    HPI     Pt is here for refills of medications to treat the following medical conditions. Unless otherwise stated, these conditions are well-controlled, pt is taking medications as Rx, and there are no reported side effects to medications or other treatment: gastritis, chronic neck pain, hyperlipidemia - states that he stopped taking the medication \"a few months ago\", allergy, DM2.     Review of Systems   All other systems reviewed and are negative.      Objective   /74   Pulse 74   Temp 36.9 °C (98.4 °F) (Oral)   Ht 1.778 m (5' 10\")   Wt 101 kg (222 lb 12.8 oz)   SpO2 98%   BMI 31.97 kg/m²     Physical Exam  Vitals reviewed.   Constitutional:       General: He is awake.      Appearance: Normal appearance. He is well-developed.   HENT:      Head: Normocephalic and atraumatic.   Cardiovascular:      Rate and Rhythm: Normal rate.   Pulmonary:      Effort: Pulmonary effort is normal.   Musculoskeletal:      Cervical back: Full passive range of motion without pain.      Right lower leg: No edema.      Left lower leg: No edema.   Skin:     General: Skin is warm and dry.      Findings: No lesion or rash.   Neurological:      General: No focal deficit present.      Mental Status: He is alert and oriented to person, place, and time.      Cranial Nerves: No facial asymmetry.      Motor: Motor function is intact.      Gait: Gait is intact.   Psychiatric:         Attention and Perception: Attention normal.         Mood and Affect: Mood and affect normal.         Assessment/Plan   Problem List Items Addressed This Visit    None  Visit Diagnoses         Codes    Acute gastritis without hemorrhage, unspecified gastritis type     K29.00    Relevant Medications    omeprazole (PriLOSEC) 40 mg DR capsule    Chronic neck pain     M54.2, G89.29    Relevant Medications    tiZANidine (Zanaflex) 4 mg tablet    Hyperlipidemia, unspecified " hyperlipidemia type     E78.5    Relevant Medications    rosuvastatin (Crestor) 20 mg tablet    Other Relevant Orders    Lipid panel    Seasonal allergic rhinitis due to pollen     J30.1    Relevant Medications    loratadine (Claritin) 10 mg tablet    Type 2 diabetes mellitus without complication, without long-term current use of insulin (Multi)     E11.9    Relevant Medications    SITagliptin phosphate (Januvia) 100 mg tablet    Other Relevant Orders    Hemoglobin A1c    Comprehensive metabolic panel          Change Lipitor 40 mg to Crestor 20 mg to see if this helps side effects. Discussed low cholesterol diet as well. Will recheck in 90 days, has not been on medication most of the time in the last 3 months.   A1c still above goal at 7.1%, will increase Januvia to 100 mg  Remaining medications were refilled x 90 days without changes

## 2024-10-11 ENCOUNTER — APPOINTMENT (OUTPATIENT)
Dept: PODIATRY | Facility: CLINIC | Age: 49
End: 2024-10-11
Payer: MEDICAID

## 2024-10-11 DIAGNOSIS — E11.9 DIABETES MELLITUS WITHOUT COMPLICATION (MULTI): Primary | ICD-10-CM

## 2024-10-11 PROCEDURE — 99212 OFFICE O/P EST SF 10 MIN: CPT | Performed by: PODIATRIST

## 2024-10-11 PROCEDURE — 3051F HG A1C>EQUAL 7.0%<8.0%: CPT | Performed by: PODIATRIST

## 2024-10-11 PROCEDURE — 1036F TOBACCO NON-USER: CPT | Performed by: PODIATRIST

## 2024-10-11 PROCEDURE — 3050F LDL-C >= 130 MG/DL: CPT | Performed by: PODIATRIST

## 2024-10-11 NOTE — PROGRESS NOTES
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 Better sugar control, hematoma of the nail is growing out.  PCP: Dr. Montejo  Last visit: 10-9-24      PMH  Medical History           Past Medical History:   Diagnosis Date    Personal history of other endocrine, nutritional and metabolic disease       History of diabetes mellitus         MEDS     Current Outpatient Medications:     atorvastatin (Lipitor) 40 mg tablet, Take 1 tablet (40 mg) by mouth once daily., Disp: 90 tablet, Rfl: 0    doxycycline (Vibramycin) 100 mg capsule, Take 1 capsule (100 mg) by mouth 2 times a day for 7 days. Take with at least 8 ounces (large glass) of water, do not lie down for 30 minutes after, Disp: 14 capsule, Rfl: 0    ibuprofen 800 mg tablet, TAKE 1 TABLET BY MOUTH EVERY 8 HOURS IF NEEDED FOR MILD PAIN, Disp: , Rfl:     ipratropium (Atrovent) 42 mcg (0.06 %) nasal spray, Administer 2 sprays into each nostril 4 times a day as needed for rhinitis., Disp: 15 mL, Rfl: 12    Januvia 50 mg tablet, Take 1 tablet (50 mg) by mouth once daily., Disp: 90 tablet, Rfl: 0    meloxicam (Mobic) 15 mg tablet, Take 1 tablet (15 mg) by mouth once daily., Disp: 90 tablet, Rfl: 0    omeprazole (PriLOSEC) 40 mg DR capsule, Take 1 capsule (40 mg) by mouth once daily in the morning. Take before meals. Do not crush or chew., Disp: 30 capsule, Rfl: 0    tiZANidine (Zanaflex) 4 mg tablet, Take 1 tablet (4 mg) by mouth every 8 hours if needed for muscle spasms., Disp: 60 tablet, Rfl: 2  Allergies  No Known Allergies  Social History   Social History                Socioeconomic History    Marital status: Single       Spouse name: None    Number of children: None    Years of education: None    Highest education level: None   Occupational History    None   Tobacco Use    Smoking status: Never    Smokeless tobacco: Never   Substance and Sexual  Activity    Alcohol use: Not Currently    Drug use: Yes       Types: Marijuana    Sexual activity: None   Other Topics Concern    None   Social History Narrative    None      Social Determinants of Health      Financial Resource Strain: Not on file   Food Insecurity: Not on file   Transportation Needs: Not on file   Physical Activity: Not on file   Stress: Not on file   Social Connections: Not on file   Intimate Partner Violence: Not on file   Housing Stability: Not on file         Family History   No family history on file.      Surgical History             Past Surgical History:   Procedure Laterality Date    OTHER SURGICAL HISTORY   03/05/2021     Knee surgery    OTHER SURGICAL HISTORY   03/05/2021     Lower back surgery            REVIEW OF SYSTEMS     + as noted above in HPI.         Physical examination:   On General Observation: Patient is a pleasant, cooperative, well developed 49 y.o.  adult male. The patient is alert and oriented to time, place and person. Patient has normal affect and mood.  There were no vitals taken for this visit.     Vascular:  DP and PT pulses are 2/4 b/l.  no edema noted. no varicosities b/l.  CFT  5 seconds to all digits bilateral.  Skin temperature is warm to warm from proximal to distal bilateral.       Muscular: Strength is 5/5 b/l.  Motor strength is normal and symmetric proximally, as well as distally, in all four extremities. Good mobility of all extremities.       Neuro:  Proprioception present.   Sensation to vibration is  present. Protective sensation present  at all pedal sites via [place] Randall 5.07 monofilament bilateral.  Light touch intact bilateral.      Derm: Hematoma of the hallux nails is growing out, no lysis is present, no odor or drainage.     Ortho:  Rom stable stj, aj b/l le           ASSESSMENT:    Hematoma hallux resolving  E11.65      PLAN:   Exam  A comprehensive history and physical examination were preformed. The patient was educated on clinical  findings, diagnosis and treatment plans. Patient understands all that has been explained and all questions were answered to apparent satisfaction.   -reviewed etiology of hematoma and options, Nail bed are dry and stable. Hematoma is growing out.  DM foot exam, better sugars, no acute issues.        Sam Knapp DPM

## 2025-01-03 ENCOUNTER — APPOINTMENT (OUTPATIENT)
Dept: PODIATRY | Facility: CLINIC | Age: 50
End: 2025-01-03
Payer: MEDICAID

## 2025-01-08 ENCOUNTER — APPOINTMENT (OUTPATIENT)
Dept: PRIMARY CARE | Facility: CLINIC | Age: 50
End: 2025-01-08
Payer: MEDICAID

## 2025-01-16 ENCOUNTER — APPOINTMENT (OUTPATIENT)
Dept: PRIMARY CARE | Facility: CLINIC | Age: 50
End: 2025-01-16
Payer: MEDICAID

## 2025-01-17 ENCOUNTER — OFFICE VISIT (OUTPATIENT)
Dept: PRIMARY CARE | Facility: CLINIC | Age: 50
End: 2025-01-17
Payer: MEDICAID

## 2025-01-17 VITALS
BODY MASS INDEX: 30.69 KG/M2 | HEIGHT: 70 IN | DIASTOLIC BLOOD PRESSURE: 66 MMHG | TEMPERATURE: 97.9 F | SYSTOLIC BLOOD PRESSURE: 112 MMHG | WEIGHT: 214.4 LBS | HEART RATE: 84 BPM | OXYGEN SATURATION: 97 %

## 2025-01-17 DIAGNOSIS — J30.1 SEASONAL ALLERGIC RHINITIS DUE TO POLLEN: ICD-10-CM

## 2025-01-17 DIAGNOSIS — M54.2 CHRONIC NECK PAIN: ICD-10-CM

## 2025-01-17 DIAGNOSIS — H10.31 ACUTE CONJUNCTIVITIS OF RIGHT EYE, UNSPECIFIED ACUTE CONJUNCTIVITIS TYPE: Primary | ICD-10-CM

## 2025-01-17 DIAGNOSIS — E11.9 TYPE 2 DIABETES MELLITUS WITHOUT COMPLICATION, WITHOUT LONG-TERM CURRENT USE OF INSULIN (MULTI): ICD-10-CM

## 2025-01-17 DIAGNOSIS — G89.29 CHRONIC NECK PAIN: ICD-10-CM

## 2025-01-17 DIAGNOSIS — E78.5 HYPERLIPIDEMIA, UNSPECIFIED HYPERLIPIDEMIA TYPE: ICD-10-CM

## 2025-01-17 DIAGNOSIS — M19.90 ARTHRITIS: ICD-10-CM

## 2025-01-17 DIAGNOSIS — K29.00 ACUTE GASTRITIS WITHOUT HEMORRHAGE, UNSPECIFIED GASTRITIS TYPE: ICD-10-CM

## 2025-01-17 LAB — POC HEMOGLOBIN A1C: 5.9 % (ref 4.2–6.5)

## 2025-01-17 PROCEDURE — 83036 HEMOGLOBIN GLYCOSYLATED A1C: CPT | Performed by: FAMILY MEDICINE

## 2025-01-17 PROCEDURE — 3074F SYST BP LT 130 MM HG: CPT | Performed by: FAMILY MEDICINE

## 2025-01-17 PROCEDURE — 3078F DIAST BP <80 MM HG: CPT | Performed by: FAMILY MEDICINE

## 2025-01-17 PROCEDURE — 3008F BODY MASS INDEX DOCD: CPT | Performed by: FAMILY MEDICINE

## 2025-01-17 PROCEDURE — 99215 OFFICE O/P EST HI 40 MIN: CPT | Performed by: FAMILY MEDICINE

## 2025-01-17 RX ORDER — OMEPRAZOLE 40 MG/1
40 CAPSULE, DELAYED RELEASE ORAL
Qty: 90 CAPSULE | Refills: 0 | Status: SHIPPED | OUTPATIENT
Start: 2025-01-17 | End: 2025-04-17

## 2025-01-17 RX ORDER — LORATADINE 10 MG/1
10 TABLET ORAL DAILY
Qty: 90 TABLET | Refills: 0 | Status: SHIPPED | OUTPATIENT
Start: 2025-01-17 | End: 2025-04-17

## 2025-01-17 RX ORDER — MELOXICAM 15 MG/1
15 TABLET ORAL DAILY
Qty: 90 TABLET | Refills: 0 | Status: SHIPPED | OUTPATIENT
Start: 2025-01-17 | End: 2025-04-17

## 2025-01-17 RX ORDER — ROSUVASTATIN CALCIUM 20 MG/1
20 TABLET, COATED ORAL DAILY
Qty: 90 TABLET | Refills: 0 | Status: SHIPPED | OUTPATIENT
Start: 2025-01-17 | End: 2025-04-17

## 2025-01-17 RX ORDER — TOBRAMYCIN AND DEXAMETHASONE 3; 1 MG/ML; MG/ML
2 SUSPENSION/ DROPS OPHTHALMIC
Qty: 5 ML | Refills: 0 | Status: SHIPPED | OUTPATIENT
Start: 2025-01-17 | End: 2025-01-24

## 2025-01-17 RX ORDER — TIZANIDINE HYDROCHLORIDE 4 MG/1
CAPSULE, GELATIN COATED ORAL
Qty: 60 CAPSULE | Refills: 2 | Status: SHIPPED | OUTPATIENT
Start: 2025-01-17

## 2025-01-17 ASSESSMENT — PATIENT HEALTH QUESTIONNAIRE - PHQ9
SUM OF ALL RESPONSES TO PHQ9 QUESTIONS 1 AND 2: 0
2. FEELING DOWN, DEPRESSED OR HOPELESS: NOT AT ALL
1. LITTLE INTEREST OR PLEASURE IN DOING THINGS: NOT AT ALL

## 2025-01-17 NOTE — PROGRESS NOTES
"Subjective   Patient ID: Aneesh Obrien is a 50 y.o. male who presents for Med Refill.    HPI Pt is here for refills of medications to treat the following medical conditions. Unless otherwise stated, these conditions are well-controlled, pt is taking medications s Rx, and there are no reported side effects to medications or other treatment: Allergies, DM2, Hyperlipidemia, Arthritis, Chronic neck pain.     Also c/o right eye irritation and redness that started a couple of days ago.     Review of Systems    Objective   /66   Pulse 84   Temp 36.6 °C (97.9 °F)   Ht 1.778 m (5' 10\")   Wt 97.3 kg (214 lb 6.4 oz)   SpO2 97%   BMI 30.76 kg/m²     Physical Exam    Assessment/Plan   {Assess/PlanSmartLinks:38050}       " capsule (40 mg) by mouth once daily in the morning. Take before meals. Do not crush or chew.  Hyperlipidemia, unspecified hyperlipidemia type  -     rosuvastatin (Crestor) 20 mg tablet; Take 1 tablet (20 mg) by mouth once daily.  Seasonal allergic rhinitis due to pollen  -     loratadine (Claritin) 10 mg tablet; Take 1 tablet (10 mg) by mouth once daily.  Chronic neck pain  -     tiZANidine (Zanaflex) 4 mg capsule; Take 1 capsule by mouth every 8 hours if needed for muscle spasms.  Arthritis  -     meloxicam (Mobic) 15 mg tablet; Take 1 tablet (15 mg) by mouth once daily.